# Patient Record
Sex: FEMALE | Race: BLACK OR AFRICAN AMERICAN | Employment: FULL TIME | URBAN - METROPOLITAN AREA
[De-identification: names, ages, dates, MRNs, and addresses within clinical notes are randomized per-mention and may not be internally consistent; named-entity substitution may affect disease eponyms.]

---

## 2017-03-09 DIAGNOSIS — I10 ESSENTIAL HYPERTENSION, BENIGN: ICD-10-CM

## 2017-03-09 RX ORDER — METOPROLOL SUCCINATE 25 MG/1
12.5 TABLET, EXTENDED RELEASE ORAL DAILY
Qty: 30 TAB | Refills: 6 | Status: SHIPPED | OUTPATIENT
Start: 2017-03-09 | End: 2018-03-08 | Stop reason: SDUPTHER

## 2017-04-26 ENCOUNTER — HOSPITAL ENCOUNTER (OUTPATIENT)
Dept: LAB | Age: 42
Discharge: HOME | End: 2017-04-26
Payer: COMMERCIAL

## 2017-04-26 ENCOUNTER — OFFICE VISIT (OUTPATIENT)
Dept: FAMILY MEDICINE CLINIC | Age: 42
End: 2017-04-26

## 2017-04-26 VITALS
WEIGHT: 156.4 LBS | SYSTOLIC BLOOD PRESSURE: 133 MMHG | BODY MASS INDEX: 28.78 KG/M2 | RESPIRATION RATE: 16 BRPM | HEIGHT: 62 IN | OXYGEN SATURATION: 98 % | TEMPERATURE: 98.2 F | HEART RATE: 82 BPM | DIASTOLIC BLOOD PRESSURE: 89 MMHG

## 2017-04-26 DIAGNOSIS — Z12.31 ENCOUNTER FOR SCREENING MAMMOGRAM FOR BREAST CANCER: ICD-10-CM

## 2017-04-26 DIAGNOSIS — Z51.81 ENCOUNTER FOR MEDICATION MONITORING: ICD-10-CM

## 2017-04-26 DIAGNOSIS — I10 ESSENTIAL HYPERTENSION, BENIGN: ICD-10-CM

## 2017-04-26 DIAGNOSIS — Z13.1 SCREENING FOR DIABETES MELLITUS: ICD-10-CM

## 2017-04-26 DIAGNOSIS — Z00.00 ROUTINE GENERAL MEDICAL EXAMINATION AT A HEALTH CARE FACILITY: Primary | ICD-10-CM

## 2017-04-26 DIAGNOSIS — N94.89 MENSTRUAL BLOATING: ICD-10-CM

## 2017-04-26 DIAGNOSIS — R14.0 MENSTRUAL BLOATING: ICD-10-CM

## 2017-04-26 DIAGNOSIS — Z11.4 SCREENING FOR HIV WITHOUT PRESENCE OF RISK FACTORS: ICD-10-CM

## 2017-04-26 DIAGNOSIS — Z12.4 SCREENING FOR MALIGNANT NEOPLASM OF CERVIX: ICD-10-CM

## 2017-04-26 LAB
BILIRUB UR QL STRIP: NEGATIVE
GLUCOSE UR-MCNC: NEGATIVE MG/DL
HBA1C MFR BLD HPLC: 5.8 %
KETONES P FAST UR STRIP-MCNC: NEGATIVE MG/DL
PH UR STRIP: 6 [PH] (ref 4.6–8)
PROT UR QL STRIP: NEGATIVE MG/DL
SP GR UR STRIP: 1 (ref 1–1.03)
UA UROBILINOGEN AMB POC: NORMAL (ref 0.2–1)
URINALYSIS CLARITY POC: CLEAR
URINALYSIS COLOR POC: YELLOW
URINE BLOOD POC: NEGATIVE
URINE LEUKOCYTES POC: NEGATIVE
URINE NITRITES POC: NEGATIVE

## 2017-04-26 PROCEDURE — 87624 HPV HI-RISK TYP POOLED RSLT: CPT | Performed by: FAMILY MEDICINE

## 2017-04-26 PROCEDURE — 88175 CYTOPATH C/V AUTO FLUID REDO: CPT | Performed by: FAMILY MEDICINE

## 2017-04-26 RX ORDER — NORETHINDRONE ACETATE, ETHINYL ESTRADIOL AND FERROUS FUMARATE 1MG-20(24)
KIT ORAL
Refills: 12 | COMMUNITY
Start: 2017-04-10 | End: 2017-07-05 | Stop reason: SDUPTHER

## 2017-04-26 RX ORDER — OSELTAMIVIR PHOSPHATE 75 MG
CAPSULE ORAL
COMMUNITY
Start: 2017-03-30 | End: 2017-04-26 | Stop reason: ALTCHOICE

## 2017-04-26 RX ORDER — HYDROCHLOROTHIAZIDE 25 MG/1
TABLET ORAL
Qty: 90 TAB | Refills: 3 | Status: SHIPPED | OUTPATIENT
Start: 2017-04-26 | End: 2018-07-13 | Stop reason: SDUPTHER

## 2017-04-26 RX ORDER — FLUCONAZOLE 150 MG/1
TABLET ORAL
Refills: 1 | COMMUNITY
Start: 2017-02-14 | End: 2017-04-26 | Stop reason: ALTCHOICE

## 2017-04-26 NOTE — PROGRESS NOTES
Chief Complaint   Patient presents with    Complete Physical     with pap   LMP 4/19/2017         CMP 7/29/2015    Lipid 7/29/2015    A1C 7/29/2015    Verbal order received per Dr. Koehler- obtain UA without micro- VORB

## 2017-04-26 NOTE — MR AVS SNAPSHOT
Visit Information Date & Time Provider Department Dept. Phone Encounter #  
 4/26/2017  8:15 AM Amy Clark MD [facility] [phone] [id number] Follow-up Instructions Return in about 6 months (around 10/26/2017). Upcoming Health Maintenance Date Due  
 PAP AKA CERVICAL CYTOLOGY 4/26/2020 DTaP/Tdap/Td series (2 - Td) 8/8/2024 Allergies as of 4/26/2017  Review Complete On: 4/26/2017 By: Amy Clark MD  
  
 Severity Noted Reaction Type Reactions Sulfa (Sulfonamide Antibiotics) Medium 07/16/2014    Hives Current Immunizations  Reviewed on 4/26/2017 Name Date Influenza Vaccine 10/5/2015, 10/20/2014 Tdap 8/8/2014 Reviewed by Amy Clark MD on 4/26/2017 at  8:55 AM  
You Were Diagnosed With   
  
 Codes Comments Routine general medical examination at a health care facility    -  Primary ICD-10-CM: Z00.00 ICD-9-CM: V70.0 Essential hypertension, benign     ICD-10-CM: I10 
ICD-9-CM: 401.1 Encounter for medication monitoring     ICD-10-CM: Z51.81 
ICD-9-CM: V58.83 Screening for diabetes mellitus     ICD-10-CM: Z13.1 ICD-9-CM: V77.1 Screening for malignant neoplasm of cervix     ICD-10-CM: Z12.4 ICD-9-CM: V76.2 Menstrual bloating     ICD-10-CM: N94.9 ICD-9-CM: 625.8 Encounter for screening mammogram for breast cancer     ICD-10-CM: Z12.31 
ICD-9-CM: V76.12 Vitals BP Pulse Temp Resp Height(growth percentile) Weight(growth percentile) 133/89 (BP 1 Location: Left arm, BP Patient Position: Sitting) 82 98.2 °F (36.8 °C) (Oral) 16 5' 2\" (1.575 m) 156 lb 6.4 oz (70.9 kg) LMP SpO2 BMI OB Status Smoking Status 04/19/2017 98% 28.61 kg/m2 Having regular periods Never Smoker Vitals History BMI and BSA Data Body Mass Index Body Surface Area  
 28.61 kg/m 2 1.76 m 2 Preferred Pharmacy Pharmacy [facility] Phone [facility] [address] [phone] Your Updated Medication List  
  
   
This list is accurate as of: 4/26/17  9:16 AM.  Always use your most recent med list.  
  
  
  
  
 hydroCHLOROthiazide 25 mg tablet Commonly known as:  HYDRODIURIL One pills daily as needed for menstrual bloating. metoprolol succinate 25 mg XL tablet Commonly known as:  TOPROL-XL Take 0.5 Tabs by mouth daily. MIBELAS 24 FE 1 mg-20 mcg(24) /75 mg (4) Chew Generic drug:  norethindrone-e.estradiol-iron TAKE 1 TABLET BY MOUTH EVERY DAY  
  
 XALATAN 0.005 % ophthalmic solution Generic drug:  latanoprost  
Administer 1 Drop to both eyes nightly. Prescriptions Sent to Pharmacy Refills  
 hydroCHLOROthiazide (HYDRODIURIL) 25 mg tablet 3 Sig: One pills daily as needed for menstrual bloating. Class: Normal  
 Pharmacy: [facility], [address] [place] [address] Ph #: [phone] We Performed the Following AMB POC HEMOGLOBIN A1C [73665 CPT(R)] AMB POC URINALYSIS DIP STICK AUTO W/ MICRO [70800 CPT(R)] CBC W/O DIFF [41388 CPT(R)] LIPID PANEL [32920 CPT(R)] METABOLIC PANEL, COMPREHENSIVE [95259 CPT(R)]   
 PAP (IMAGE GUIDED) W/REFL HPV ALL PATHOLOGY (826631) [WMV7002 Custom] Follow-up Instructions Return in about 6 months (around 10/26/2017). To-Do List   
 04/26/2017 Imaging:  [facility] MAMMO BI SCREENING INCL CAD Introducing [facility]! Dear Kisha Hernandez: Thank you for requesting a tripJane account. Our records indicate that you already have an active tripJane account. You can access your account anytime at [email or web address] Did you know that you can access your hospital and ER discharge instructions at any time in tripJane? You can also review all of your test results from your hospital stay or ER visit. Additional Information If you have questions, please visit the Frequently Asked Questions section of the [employer] website at [email or web address] Remember, [employer] is NOT to be used for urgent needs. For medical emergencies, dial 911. Now available from your iPhone and Android! Please provide this summary of care documentation to your next provider. Your primary care clinician is listed as Ursula Parker. If you have any questions after today's visit, please call [phone].

## 2017-04-26 NOTE — PROGRESS NOTES
Subjective:   37 y.o. female for Well Woman Check. Patient's last menstrual period was 04/19/2017. Social History: single partner, contraception - OCP (Oral Contraceptive Pills). Periods are regular. Bleeding about 5 days. No cramps or clots. Had myomectomy per gyn in December 2016. HTN follow up:  Compliant w/ meds, low salt diet, and exercise. No home bp monitoring. No swelling, headache or dizziness. No chest pain, SOB, palpitations. Otherwise feeling well since the last visit. Patient Active Problem List   Diagnosis Code    Glaucoma H40.9    Essential hypertension, benign I10    Encounter for medication monitoring Z51.81       Current Outpatient Prescriptions   Medication Sig Dispense Refill    MIBELAS 24 FE 1 mg-20 mcg(24) /75 mg (4) chew TAKE 1 TABLET BY MOUTH EVERY DAY  12    metoprolol succinate (TOPROL-XL) 25 mg XL tablet Take 0.5 Tabs by mouth daily. 30 Tab 6    hydrochlorothiazide (HYDRODIURIL) 25 mg tablet One pills daily as needed for menstrual bloating. 90 Tab 3    latanoprost (XALATAN) 0.005 % ophthalmic solution Administer 1 Drop to both eyes nightly. Allergies   Allergen Reactions    Sulfa (Sulfonamide Antibiotics) Hives       History reviewed. No pertinent past medical history. Past Surgical History:   Procedure Laterality Date    HX MYOMECTOMY  12/21/2016    Dr. Ericka Arceo       Family History   Problem Relation Age of Onset    Hypertension Mother     Lupus Mother     Heart Disease Mother     Cancer Father      lung cancer    Diabetes Sister     Hypertension Sister     Hypertension Brother     Kidney Disease Brother     Hypertension Sister        Social History   Substance Use Topics    Smoking status: Never Smoker    Smokeless tobacco: Not on file    Alcohol use 0.0 oz/week     0 Standard drinks or equivalent per week      Comment: occasionally          ROS:  Feeling well. No dyspnea or chest pain on exertion.   No abdominal pain, change in bowel habits, black or bloody stools. No urinary tract symptoms. GYN ROS: normal menses, no abnormal bleeding, pelvic pain or discharge, no breast pain or new or enlarging lumps on self exam. No neurological complaints. Objective:     Visit Vitals    /89 (BP 1 Location: Left arm, BP Patient Position: Sitting)    Pulse 82    Temp 98.2 °F (36.8 °C) (Oral)    Resp 16    Ht 5' 2\" (1.575 m)    Wt 156 lb 6.4 oz (70.9 kg)    LMP 04/19/2017    SpO2 98%    BMI 28.61 kg/m2     The patient appears well, alert, oriented x 3, in no distress. ENT normal.  Neck supple. No adenopathy or thyromegaly. KATERINA. Lungs are clear, good air entry, no wheezes, rhonchi or rales. S1 and S2 normal, no murmurs, regular rate and rhythm. Abdomen soft without tenderness, guarding, mass or organomegaly. Extremities show no edema, normal peripheral pulses. Neurological is normal, no focal findings. BREAST EXAM: breasts appear normal, no suspicious masses, no skin or nipple changes or axillary nodes    PELVIC EXAM: normal external genitalia, vulva, vagina, cervix, slightly enlarge uterus is noted.    Normal adnexa, PAP: Pap smear done today    Assessment/Plan:   well woman  no contraindication to continue use of oral contraceptives  pap smear  counseled on breast self exam, mammography screening and adequate intake of calcium and vitamin D  additional lab tests per orders  Laura Reeves was seen today for complete physical.    Diagnoses and all orders for this visit:    Routine general medical examination at a health care facility  -     AMB POC URINALYSIS DIP STICK AUTO W/ MICRO    Essential hypertension, benign  -     LIPID PANEL    Encounter for medication monitoring  -     METABOLIC PANEL, COMPREHENSIVE  -     CBC W/O DIFF    Screening for diabetes mellitus  -     AMB POC HEMOGLOBIN A1C    Screening for malignant neoplasm of cervix  -     PAP (IMAGE GUIDED) W/REFL HPV ALL PATHOLOGY (201235)    Menstrual bloating  -    Refill hydroCHLOROthiazide (HYDRODIURIL) 25 mg tablet; One pills daily as needed for menstrual bloating. Encounter for screening mammogram for breast cancer  -     [facility] MAMMO BI SCREENING INCL CAD; Future      Follow-up Disposition: 6 months. Reviewed diet, exercise and weight control  cardiovascular risk and specific lipid/LDL goals reviewed  reviewed medications and side effects in detail. I have discussed diagnosis listed in this note with pt and/or family. I have discussed treatment plans and options and the risk/benefit analysis of those options, including safe use of medications and possible medication side effects. Through the use of shared decision making we have agreed to the above plan. The patient has received an after-visit summary and questions were answered concerning future plans and follow up. Advise pt of any urgent changes then to proceed to the ER.

## 2017-04-27 LAB
ALBUMIN SERPL-MCNC: 4.4 G/DL (ref 3.5–5.5)
ALBUMIN/GLOB SERPL: 1.4 {RATIO} (ref 1.2–2.2)
ALP SERPL-CCNC: 86 IU/L (ref 39–117)
ALT SERPL-CCNC: 18 IU/L (ref 0–32)
AST SERPL-CCNC: 20 IU/L (ref 0–40)
BILIRUB SERPL-MCNC: 0.4 MG/DL (ref 0–1.2)
BUN SERPL-MCNC: 13 MG/DL (ref 6–24)
BUN/CREAT SERPL: 14 (ref 9–23)
CALCIUM SERPL-MCNC: 9.4 MG/DL (ref 8.7–10.2)
CHLORIDE SERPL-SCNC: 95 MMOL/L (ref 96–106)
CHOLEST SERPL-MCNC: 167 MG/DL (ref 100–199)
CO2 SERPL-SCNC: 26 MMOL/L (ref 18–29)
CREAT SERPL-MCNC: 0.95 MG/DL (ref 0.57–1)
ERYTHROCYTE [DISTWIDTH] IN BLOOD BY AUTOMATED COUNT: 14.3 % (ref 12.3–15.4)
GLOBULIN SER CALC-MCNC: 3.2 G/DL (ref 1.5–4.5)
GLUCOSE SERPL-MCNC: 101 MG/DL (ref 65–99)
HCT VFR BLD AUTO: 41.1 % (ref 34–46.6)
HDLC SERPL-MCNC: 65 MG/DL
HGB BLD-MCNC: 14.1 G/DL (ref 11.1–15.9)
HIV 1+2 AB+HIV1 P24 AG SERPL QL IA: NON REACTIVE
INTERPRETATION, 910389: NORMAL
LDLC SERPL CALC-MCNC: 90 MG/DL (ref 0–99)
MCH RBC QN AUTO: 30.7 PG (ref 26.6–33)
MCHC RBC AUTO-ENTMCNC: 34.3 G/DL (ref 31.5–35.7)
MCV RBC AUTO: 89 FL (ref 79–97)
PLATELET # BLD AUTO: 210 X10E3/UL (ref 150–379)
POTASSIUM SERPL-SCNC: 3.1 MMOL/L (ref 3.5–5.2)
PROT SERPL-MCNC: 7.6 G/DL (ref 6–8.5)
RBC # BLD AUTO: 4.6 X10E6/UL (ref 3.77–5.28)
SODIUM SERPL-SCNC: 139 MMOL/L (ref 134–144)
TRIGL SERPL-MCNC: 62 MG/DL (ref 0–149)
VLDLC SERPL CALC-MCNC: 12 MG/DL (ref 5–40)
WBC # BLD AUTO: 4.8 X10E3/UL (ref 3.4–10.8)

## 2017-05-03 RX ORDER — POTASSIUM CHLORIDE 750 MG/1
TABLET, EXTENDED RELEASE ORAL
Qty: 30 TAB | Refills: 3 | Status: SHIPPED | OUTPATIENT
Start: 2017-05-03 | End: 2017-10-18

## 2017-05-04 ENCOUNTER — TELEPHONE (OUTPATIENT)
Dept: FAMILY MEDICINE CLINIC | Age: 42
End: 2017-05-04

## 2017-05-05 ENCOUNTER — TELEPHONE (OUTPATIENT)
Dept: FAMILY MEDICINE CLINIC | Age: 42
End: 2017-05-05

## 2017-05-05 DIAGNOSIS — R87.810 ASCUS WITH POSITIVE HIGH RISK HPV CERVICAL: Primary | ICD-10-CM

## 2017-05-05 DIAGNOSIS — R87.610 ASCUS WITH POSITIVE HIGH RISK HPV CERVICAL: Primary | ICD-10-CM

## 2017-05-05 NOTE — TELEPHONE ENCOUNTER
Pt reports she was returning a call from Dr. Koehler regarding pap results. Informed pt Dr. Koehler was seeing pts and will forward message to Dr. Koehler. Pt verbalized understanding. Pt can be reached at [phone].

## 2017-05-26 ENCOUNTER — DOCUMENTATION ONLY (OUTPATIENT)
Dept: FAMILY MEDICINE CLINIC | Age: 42
End: 2017-05-26

## 2017-05-26 NOTE — PROGRESS NOTES
Appointment with Dr Octavia Haddad on Monday, June 5, 2017 at 9:45 am for abnormal pap. Above appointment was cancelled per Dr Katie Chauhan. He will see her in July. Message left for the patient.

## 2017-06-16 DIAGNOSIS — Z12.31 ENCOUNTER FOR SCREENING MAMMOGRAM FOR BREAST CANCER: ICD-10-CM

## 2017-07-05 RX ORDER — NORETHINDRONE ACETATE, ETHINYL ESTRADIOL AND FERROUS FUMARATE 1MG-20(24)
1 KIT ORAL DAILY
Qty: 1 DOSE PACK | Refills: 0 | Status: SHIPPED | OUTPATIENT
Start: 2017-07-05 | End: 2017-07-05 | Stop reason: SDUPTHER

## 2017-07-05 RX ORDER — NORETHINDRONE ACETATE, ETHINYL ESTRADIOL AND FERROUS FUMARATE 1MG-20(24)
1 KIT ORAL DAILY
Qty: 1 DOSE PACK | Refills: 0 | Status: SHIPPED | OUTPATIENT
Start: 2017-07-05 | End: 2017-08-07 | Stop reason: SDUPTHER

## 2017-08-07 RX ORDER — NORETHINDRONE ACETATE, ETHINYL ESTRADIOL AND FERROUS FUMARATE 1MG-20(24)
KIT ORAL
Refills: 0 | OUTPATIENT
Start: 2017-08-07

## 2017-08-07 RX ORDER — NORETHINDRONE ACETATE, ETHINYL ESTRADIOL AND FERROUS FUMARATE 1MG-20(24)
1 KIT ORAL DAILY
Qty: 1 DOSE PACK | Refills: 11 | Status: SHIPPED | OUTPATIENT
Start: 2017-08-07 | End: 2017-12-06 | Stop reason: SDUPTHER

## 2017-10-17 NOTE — PROGRESS NOTES
HISTORY OF PRESENT ILLNESS  Alba Cho is a 40 y.o. female. HPI   Follow up on BP. Tolerating toprol. HTN follow up:  Compliant w/ meds, low salt diet, and exercise. No home bp monitoring. No swelling, headache or dizziness. No chest pain, SOB, palpitations. Otherwise feeling well since the last visit. C/p trouble selling over the past 1 month. Wakes in the middle of the night and not able to fall back to sleep. Only caffeine is in th morning with coffee. Patient Active Problem List   Diagnosis Code    Glaucoma H40.9    Essential hypertension, benign I10    Encounter for medication monitoring Z51.81       Current Outpatient Prescriptions   Medication Sig Dispense Refill    MIBELAS 24 FE 1 mg-20 mcg(24) /75 mg (4) chew Take 1 Tab by mouth daily. 1 Dose Pack 11    potassium chloride (KLOR-CON) 10 mEq tablet Take 2 pills daily for 1 week for low potassium. Then take 2 pills daily on the days that you take your fluid pill. 30 Tab 3    hydroCHLOROthiazide (HYDRODIURIL) 25 mg tablet One pills daily as needed for menstrual bloating. 90 Tab 3    metoprolol succinate (TOPROL-XL) 25 mg XL tablet Take 0.5 Tabs by mouth daily. 30 Tab 6    latanoprost (XALATAN) 0.005 % ophthalmic solution Administer 1 Drop to both eyes nightly. Allergies   Allergen Reactions    Sulfa (Sulfonamide Antibiotics) Hives       No past medical history on file.       Past Surgical History:   Procedure Laterality Date    HX MYOMECTOMY  12/21/2016    Dr. Aleksandar Clements         Family History   Problem Relation Age of Onset    Hypertension Mother     Lupus Mother     Heart Disease Mother     Cancer Father      lung cancer    Diabetes Sister     Hypertension Sister     Hypertension Brother     Kidney Disease Brother     Hypertension Sister        Social History   Substance Use Topics    Smoking status: Never Smoker    Smokeless tobacco: Not on file    Alcohol use 0.0 oz/week     0 Standard drinks or equivalent per week      Comment: occasionally        Lab Results   Component Value Date/Time    WBC 4.8 04/26/2017 09:23 AM    HGB 14.1 04/26/2017 09:23 AM    HCT 41.1 04/26/2017 09:23 AM    PLATELET 962 33/50/5938 09:23 AM    MCV 89 04/26/2017 09:23 AM     Lab Results   Component Value Date/Time    Cholesterol, total 167 04/26/2017 09:23 AM    HDL Cholesterol 65 04/26/2017 09:23 AM    LDL, calculated 90 04/26/2017 09:23 AM    Triglyceride 62 04/26/2017 09:23 AM     Lab Results   Component Value Date/Time    ALT (SGPT) 18 04/26/2017 09:23 AM    AST (SGOT) 20 04/26/2017 09:23 AM    Alk. phosphatase 86 04/26/2017 09:23 AM    Bilirubin, total 0.4 04/26/2017 09:23 AM     Lab Results   Component Value Date/Time    GFR est AA 87 04/26/2017 09:23 AM    GFR est non-AA 75 04/26/2017 09:23 AM    Creatinine 0.95 04/26/2017 09:23 AM    BUN 13 04/26/2017 09:23 AM    Sodium 139 04/26/2017 09:23 AM    Potassium 3.1 04/26/2017 09:23 AM    Chloride 95 04/26/2017 09:23 AM    CO2 26 04/26/2017 09:23 AM      Lab Results   Component Value Date/Time    Hemoglobin A1c (POC) 5.8 04/26/2017 09:23 AM     Review of Systems   Constitutional: Negative for malaise/fatigue. HENT: Negative for congestion. Eyes: Negative for blurred vision. Respiratory: Negative for cough and shortness of breath. Cardiovascular: Negative for chest pain, palpitations and leg swelling. Gastrointestinal: Negative for heartburn, abdominal pain and constipation. Genitourinary: Negative for dysuria, urgency and frequency. Musculoskeletal: Negative for back pain and joint pain. Neurological: Negative for dizziness, tingling and headaches. Endo/Heme/Allergies: Negative for environmental allergies. Psychiatric/Behavioral: Negative for depression. The patient does not have insomnia. Physical Exam   Constitutional: She appears well-developed and well-nourished.    /80  Pulse 78  Temp 98.8 °F (37.1 °C) (Oral)   Resp 18  Ht 5' 2\" (1.575 m)  Wt 155 lb 6.4 oz (70.5 kg)  LMP 10/04/2017  SpO2 98%  BMI 28.42 kg/m2    HENT:   Right Ear: Tympanic membrane and ear canal normal.   Left Ear: Tympanic membrane and ear canal normal.   Nose: No mucosal edema or rhinorrhea. Mouth/Throat: Oropharynx is clear and moist and mucous membranes are normal.   Neck: Normal range of motion. Neck supple. No thyromegaly present. Cardiovascular: Normal rate and regular rhythm. No murmur heard. Pulmonary/Chest: Effort normal and breath sounds normal.   Abdominal: Soft. Bowel sounds are normal. There is no tenderness. Musculoskeletal: Normal range of motion. She exhibits no edema. Lymphadenopathy:     She has no cervical adenopathy. Skin: Skin is warm and dry. Psychiatric: She has a normal mood and affect. Nursing note and vitals reviewed. ASSESSMENT and PLAN  Diagnoses and all orders for this visit:    1. Essential hypertension, benign  Stable     2. Encounter for medication monitoring  -     METABOLIC PANEL, BASIC    3. Primary insomnia  -     TSH 3RD GENERATION  -     melatonin 5 mg cap capsule; Take 1 Cap by mouth nightly. Follow-up Disposition:  Return in about 6 months (around 4/18/2018). reviewed diet, exercise and weight control  cardiovascular risk and specific lipid/LDL goals reviewed  reviewed medications and side effects in detail    I have discussed diagnosis listed in this note with pt and/or family. I have discussed treatment plans and options and the risk/benefit analysis of those options, including safe use of medications and possible medication side effects. Through the use of shared decision making we have agreed to the above plan. The patient has received an after-visit summary and questions were answered concerning future plans and follow up. Advise pt of any urgent changes then to proceed to the ER.

## 2017-10-18 ENCOUNTER — OFFICE VISIT (OUTPATIENT)
Dept: FAMILY MEDICINE CLINIC | Age: 42
End: 2017-10-18

## 2017-10-18 VITALS
SYSTOLIC BLOOD PRESSURE: 126 MMHG | WEIGHT: 155.4 LBS | DIASTOLIC BLOOD PRESSURE: 80 MMHG | HEART RATE: 78 BPM | TEMPERATURE: 98.8 F | BODY MASS INDEX: 28.6 KG/M2 | OXYGEN SATURATION: 98 % | HEIGHT: 62 IN | RESPIRATION RATE: 18 BRPM

## 2017-10-18 DIAGNOSIS — Z51.81 ENCOUNTER FOR MEDICATION MONITORING: ICD-10-CM

## 2017-10-18 DIAGNOSIS — I10 ESSENTIAL HYPERTENSION, BENIGN: Primary | ICD-10-CM

## 2017-10-18 DIAGNOSIS — F51.01 PRIMARY INSOMNIA: ICD-10-CM

## 2017-10-18 RX ORDER — CLINDAMYCIN PHOSPHATE 100 MG/5G
CREAM VAGINAL
Refills: 1 | COMMUNITY
Start: 2017-07-14 | End: 2017-10-18 | Stop reason: ALTCHOICE

## 2017-10-18 RX ORDER — MELATONIN 5 MG
5 CAPSULE ORAL
Qty: 30 CAP | Refills: 0
Start: 2017-10-18 | End: 2018-04-18 | Stop reason: ALTCHOICE

## 2017-10-18 NOTE — MR AVS SNAPSHOT
Visit Information Date & Time Provider Department Dept. Phone Encounter #  
 10/18/2017  8:15 AM David Barry MD [facility] [phone] [id number] Follow-up Instructions Return in about 6 months (around 4/18/2018). Upcoming Health Maintenance Date Due INFLUENZA AGE 9 TO ADULT 11/6/2017* PAP AKA CERVICAL CYTOLOGY 4/26/2020 DTaP/Tdap/Td series (2 - Td) 8/8/2024 *Topic was postponed. The date shown is not the original due date. Allergies as of 10/18/2017  Review Complete On: 10/18/2017 By: David Barry MD  
  
 Severity Noted Reaction Type Reactions Sulfa (Sulfonamide Antibiotics) Medium 07/16/2014    Hives Current Immunizations  Reviewed on 10/18/2017 Name Date Influenza Vaccine 10/5/2015, 10/20/2014 Tdap 8/8/2014 Reviewed by David Barry MD on 10/18/2017 at  8:34 AM  
 Reviewed by David Barry MD on 10/18/2017 at  9:03 AM  
You Were Diagnosed With   
  
 Codes Comments Essential hypertension, benign    -  Primary ICD-10-CM: I10 
ICD-9-CM: 401.1 Encounter for medication monitoring     ICD-10-CM: Z51.81 
ICD-9-CM: V58.83 Primary insomnia     ICD-10-CM: F51.01 
ICD-9-CM: 307.42 Vitals BP Pulse Temp Resp Height(growth percentile) Weight(growth percentile) 126/80 78 98.8 °F (37.1 °C) (Oral) 18 5' 2\" (1.575 m) 155 lb 6.4 oz (70.5 kg) LMP SpO2 BMI OB Status Smoking Status 10/04/2017 98% 28.42 kg/m2 Having regular periods Never Smoker Vitals History BMI and BSA Data Body Mass Index Body Surface Area  
 28.42 kg/m 2 1.76 m 2 Preferred Pharmacy Pharmacy Name Phone [facility] [address], [address] [place] [address] [phone] Your Updated Medication List  
  
   
This list is accurate as of: 10/18/17  9:17 AM.  Always use your most recent med list.  
  
  
  
  
 hydroCHLOROthiazide 25 mg tablet Commonly known as:  HYDRODIURIL One pills daily as needed for menstrual bloating.  
  
 melatonin 5 mg Cap capsule Take 1 Cap by mouth nightly. metoprolol succinate 25 mg XL tablet Commonly known as:  TOPROL-XL Take 0.5 Tabs by mouth daily. MIBELAS 24 FE 1 mg-20 mcg(24) /75 mg (4) Chew Generic drug:  norethindrone-e.estradiol-iron Take 1 Tab by mouth daily. XALATAN 0.005 % ophthalmic solution Generic drug:  latanoprost  
Administer 1 Drop to both eyes nightly. We Performed the Following METABOLIC PANEL, BASIC [03728 CPT(R)] TSH 3RD GENERATION [47444 CPT(R)] Follow-up Instructions Return in about 6 months (around 4/18/2018). Introducing hospitals & HEALTH [facility]! Dear Dee Davies: Thank you for requesting a REQQI account. Our records indicate that you already have an active REQQI account. You can access your account anytime at [email or web address] Did you know that you can access your hospital and ER discharge instructions at any time in REQQI? You can also review all of your test results from your hospital stay or ER visit. Additional Information If you have questions, please visit the Frequently Asked Questions section of the REQQI website at [email or web address] Remember, REQQI is NOT to be used for urgent needs. For medical emergencies, dial 911. Now available from your iPhone and Android! Please provide this summary of care documentation to your next provider. Your primary care clinician is listed as Carole Bonds. If you have any questions after today's visit, please call [phone].

## 2017-10-18 NOTE — PROGRESS NOTES
Chief Complaint   Patient presents with    Hypertension     6 month follow up         CMP 4/26/2017    Lipid 4/26/2017    A1C 4/26/2017

## 2017-10-19 LAB
BUN SERPL-MCNC: 12 MG/DL (ref 6–24)
BUN/CREAT SERPL: 14 (ref 9–23)
CALCIUM SERPL-MCNC: 9.2 MG/DL (ref 8.7–10.2)
CHLORIDE SERPL-SCNC: 96 MMOL/L (ref 96–106)
CO2 SERPL-SCNC: 26 MMOL/L (ref 18–29)
CREAT SERPL-MCNC: 0.88 MG/DL (ref 0.57–1)
GLUCOSE SERPL-MCNC: 88 MG/DL (ref 65–99)
POTASSIUM SERPL-SCNC: 3.8 MMOL/L (ref 3.5–5.2)
SODIUM SERPL-SCNC: 140 MMOL/L (ref 134–144)
TSH SERPL DL<=0.005 MIU/L-ACNC: 1.87 UIU/ML (ref 0.45–4.5)

## 2017-12-06 RX ORDER — NORETHINDRONE ACETATE, ETHINYL ESTRADIOL AND FERROUS FUMARATE 1MG-20(24)
1 KIT ORAL DAILY
Qty: 1 DOSE PACK | Refills: 11 | Status: SHIPPED | OUTPATIENT
Start: 2017-12-06 | End: 2018-09-24 | Stop reason: SDUPTHER

## 2017-12-06 NOTE — TELEPHONE ENCOUNTER
----- Message from Darek Nava sent at 12/6/2017 10:11 AM EST -----  Regarding: Dr. Laureano Heading refill  Angeles Solis with [facility] [phone] called to f/up on \"Melodetta 90 day supply\". Wants to know if rx has been confirmed or denied.

## 2018-03-08 DIAGNOSIS — I10 ESSENTIAL HYPERTENSION, BENIGN: ICD-10-CM

## 2018-03-08 RX ORDER — METOPROLOL SUCCINATE 25 MG/1
12.5 TABLET, EXTENDED RELEASE ORAL DAILY
Qty: 90 TAB | Refills: 3 | Status: SHIPPED | OUTPATIENT
Start: 2018-03-08 | End: 2019-03-09 | Stop reason: SDUPTHER

## 2018-04-18 ENCOUNTER — HOSPITAL ENCOUNTER (OUTPATIENT)
Dept: LAB | Age: 43
Discharge: HOME | End: 2018-04-18
Payer: COMMERCIAL

## 2018-04-18 ENCOUNTER — OFFICE VISIT (OUTPATIENT)
Dept: FAMILY MEDICINE CLINIC | Age: 43
End: 2018-04-18

## 2018-04-18 VITALS
HEIGHT: 62 IN | TEMPERATURE: 98 F | DIASTOLIC BLOOD PRESSURE: 85 MMHG | OXYGEN SATURATION: 100 % | RESPIRATION RATE: 16 BRPM | WEIGHT: 158.4 LBS | BODY MASS INDEX: 29.15 KG/M2 | SYSTOLIC BLOOD PRESSURE: 135 MMHG | HEART RATE: 76 BPM

## 2018-04-18 DIAGNOSIS — Z00.00 ROUTINE GENERAL MEDICAL EXAMINATION AT A HEALTH CARE FACILITY: Primary | ICD-10-CM

## 2018-04-18 DIAGNOSIS — Z51.81 ENCOUNTER FOR MEDICATION MONITORING: ICD-10-CM

## 2018-04-18 DIAGNOSIS — Z12.4 SCREENING FOR MALIGNANT NEOPLASM OF CERVIX: ICD-10-CM

## 2018-04-18 DIAGNOSIS — I10 ESSENTIAL HYPERTENSION, BENIGN: ICD-10-CM

## 2018-04-18 DIAGNOSIS — Z13.1 SCREENING FOR DIABETES MELLITUS: ICD-10-CM

## 2018-04-18 LAB — HBA1C MFR BLD HPLC: 5.7 %

## 2018-04-18 PROCEDURE — 87624 HPV HI-RISK TYP POOLED RSLT: CPT | Performed by: FAMILY MEDICINE

## 2018-04-18 PROCEDURE — 88175 CYTOPATH C/V AUTO FLUID REDO: CPT | Performed by: FAMILY MEDICINE

## 2018-04-18 RX ORDER — CLINDAMYCIN PHOSPHATE 100 MG/5G
CREAM VAGINAL
Refills: 1 | COMMUNITY
Start: 2018-03-20 | End: 2018-04-18 | Stop reason: ALTCHOICE

## 2018-04-18 RX ORDER — MELATONIN 5 MG
10 CAPSULE ORAL
COMMUNITY
End: 2018-10-24 | Stop reason: ALTCHOICE

## 2018-04-18 NOTE — PROGRESS NOTES
Chief Complaint   Patient presents with    Complete Physical     with pap- cycle started today     Mammogram 5/17/2017      1. Have you been to the ER, urgent care clinic since your last visit? Hospitalized since your last visit? No    2. Have you seen or consulted any other health care providers outside of the [address] [place] [address] since your last visit? Include any pap smears or colon screening.  No

## 2018-04-18 NOTE — PROGRESS NOTES
Subjective:   40 y.o. female for Well Woman Check. Patient's last menstrual period was 04/18/2018. HTN follow up:  Compliant w/ meds, low salt diet, and exercise. No home bp monitoring. No swelling, headache or dizziness. No chest pain, SOB, palpitations. Otherwise feeling well since the last visit. Patient Active Problem List   Diagnosis Code    Glaucoma H40.9    Essential hypertension, benign I10    Encounter for medication monitoring Z51.81     Current Outpatient Prescriptions   Medication Sig Dispense Refill    melatonin 5 mg cap capsule Take 10 mg by mouth nightly as needed.  metoprolol succinate (TOPROL-XL) 25 mg XL tablet Take 0.5 Tabs by mouth daily. 90 Tab 3    MIBELAS 24 FE 1 mg-20 mcg(24) /75 mg (4) chew Take 1 Tab by mouth daily. 1 Dose Pack 11    hydroCHLOROthiazide (HYDRODIURIL) 25 mg tablet One pills daily as needed for menstrual bloating. 90 Tab 3    latanoprost (XALATAN) 0.005 % ophthalmic solution Administer 1 Drop to both eyes nightly. Allergies   Allergen Reactions    Sulfa (Sulfonamide Antibiotics) Hives     History reviewed. No pertinent past medical history. Past Surgical History:   Procedure Laterality Date    HX MYOMECTOMY  12/21/2016    Dr. Yu Narvaez     Family History   Problem Relation Age of Onset    Hypertension Mother     Lupus Mother     Heart Disease Mother     Cancer Father      lung cancer    Diabetes Sister     Hypertension Sister     Hypertension Brother     Kidney Disease Brother     Hypertension Sister      Social History   Substance Use Topics    Smoking status: Never Smoker    Smokeless tobacco: Never Used    Alcohol use 0.0 oz/week     0 Standard drinks or equivalent per week      Comment: occasionally        ROS:  Feeling well. No dyspnea or chest pain on exertion. No abdominal pain, change in bowel habits, black or bloody stools. No urinary tract symptoms.  GYN ROS: normal menses, no abnormal bleeding, pelvic pain or discharge, no breast pain or new or enlarging lumps on self exam. No neurological complaints. Objective:     Visit Vitals    /85 (BP 1 Location: Left arm, BP Patient Position: Sitting)    Pulse 76    Temp 98 °F (36.7 °C) (Oral)    Resp 16    Ht 5' 2\" (1.575 m)    Wt 158 lb 6.4 oz (71.8 kg)    LMP 04/18/2018    SpO2 100%    BMI 28.97 kg/m2     The patient appears well, alert, oriented x 3, in no distress. ENT normal.  Neck supple. No adenopathy or thyromegaly. KATERINA. Lungs are clear, good air entry, no wheezes, rhonchi or rales. S1 and S2 normal, no murmurs, regular rate and rhythm. Abdomen soft without tenderness, guarding, mass or organomegaly. Extremities show no edema, normal peripheral pulses. Neurological is normal, no focal findings. BREAST EXAM: breasts appear normal, no suspicious masses, no skin or nipple changes or axillary nodes    PELVIC EXAM: normal external genitalia, vulva, vagina, cervix, uterus and adnexa  Rectal: not done due to menstrual blood at the anal opening. Assessment/Plan:   well woman  mammogram  pap smear  counseled on breast self exam, mammography screening and adequate intake of calcium and vitamin D  additional lab tests per orders  Diagnoses and all orders for this visit:    1. Routine general medical examination at a health care facility    2. Essential hypertension, benign  Discussed sodium restriction, high k rich diet, maintaining ideal body weight and regular exercise program such as daily walking 30 min perday 4-5 times per week, as physiologic means to achieve blood pressure control.  Medication compliance advised. -     LIPID PANEL    3. Encounter for medication monitoring  -     METABOLIC PANEL, COMPREHENSIVE  -     CBC W/O DIFF    4. Screening for diabetes mellitus  -     AMB POC HEMOGLOBIN A1C    5.  Screening for malignant neoplasm of cervix  -     PAP (IMAGE GUIDED) W/REFL HPV ALL PATHOLOGY ([id number])      Follow-up Disposition:  Return in about 6 months (around 10/18/2018). reviewed diet, exercise and weight control  cardiovascular risk and specific lipid/LDL goals reviewed  reviewed medications and side effects in detail. I have discussed diagnosis listed in this note with pt and/or family. I have discussed treatment plans and options and the risk/benefit analysis of those options, including safe use of medications and possible medication side effects. Through the use of shared decision making we have agreed to the above plan. The patient has received an after-visit summary and questions were answered concerning future plans and follow up. Advise pt of any urgent changes then to proceed to the ER.

## 2018-04-18 NOTE — PROGRESS NOTES
HISTORY OF PRESENT ILLNESS  Micah Sanchez is a 40 y.o. female. HPI   Follow up on BP. Tolerating toprol. HTN follow up:  Compliant w/ meds, low salt diet, and exercise. No home bp monitoring. No swelling, headache or dizziness. No chest pain, SOB, palpitations. Otherwise feeling well since the last visit. C/p trouble selling over the past 1 month. Wakes in the middle of the night and not able to fall back to sleep. Only caffeine is in th morning with coffee. {Choose one or more HPI Chronic Disease Notes, press DELETE if none desired:03693}  {Choose one or more SmartLinks; press DELETE if none desired:[id number]}   {Choose one or more Last Lab values; press DELETE if none desired:0975255}     Review of Systems   Constitutional: Negative for malaise/fatigue. HENT: Negative for congestion. Eyes: Negative for blurred vision. Respiratory: Negative for cough and shortness of breath. Cardiovascular: Negative for chest pain, palpitations and leg swelling. Gastrointestinal: Negative for abdominal pain, constipation and heartburn. Genitourinary: Negative for dysuria, frequency and urgency. Musculoskeletal: Negative for back pain and joint pain. Neurological: Negative for dizziness, tingling and headaches. Endo/Heme/Allergies: Negative for environmental allergies. Psychiatric/Behavioral: Negative for depression. The patient does not have insomnia. Physical Exam   Constitutional: She appears well-developed and well-nourished. HENT:   Right Ear: Tympanic membrane and ear canal normal.   Left Ear: Tympanic membrane and ear canal normal.   Nose: No mucosal edema or rhinorrhea. Mouth/Throat: Oropharynx is clear and moist and mucous membranes are normal.   Neck: Normal range of motion. Neck supple. No thyromegaly present. Cardiovascular: Normal rate and regular rhythm. No murmur heard. Pulmonary/Chest: Effort normal and breath sounds normal.   Abdominal: Soft.  Bowel sounds are normal. There is no tenderness. Musculoskeletal: Normal range of motion. She exhibits no edema. Lymphadenopathy:     She has no cervical adenopathy. Skin: Skin is warm and dry. Psychiatric: She has a normal mood and affect. Nursing note and vitals reviewed.       ASSESSMENT and PLAN  {ASSESSMENT/PLAN:21122}

## 2018-04-18 NOTE — MR AVS SNAPSHOT
[facility] 
 
 
 [address] [facility] [place] [address] [address] 
[phone] Patient: Radha Stein 
MRN: [id number] :[id number] Visit Information Date & Time Provider Department Dept. Phone Encounter #  
 2018  3:00 PM Lauren Dalal MD [facility] [phone] [id number] Follow-up Instructions Return in about 6 months (around 10/18/2018). Upcoming Health Maintenance Date Due Influenza Age 5 to Adult 2017 PAP AKA CERVICAL CYTOLOGY 2020 DTaP/Tdap/Td series (2 - Td) 2024 Allergies as of 2018  Review Complete On: 2018 By: Nicolas Burks LPN Severity Noted Reaction Type Reactions Sulfa (Sulfonamide Antibiotics) Medium 2014    Hives Current Immunizations  Reviewed on 2018 Name Date Influenza Vaccine 10/30/2017, 10/5/2015, 10/20/2014 Tdap 2014 Reviewed by Nicolas Burks LPN on  at  3:11 PM  
 Reviewed by Lauren Dalal MD on 2018 at  4:06 PM  
You Were Diagnosed With   
  
 Codes Comments Essential hypertension, benign    -  Primary ICD-10-CM: I10 
ICD-9-CM: 401.1 Encounter for medication monitoring     ICD-10-CM: Z51.81 
ICD-9-CM: V58.83 Screening for diabetes mellitus     ICD-10-CM: Z13.1 ICD-9-CM: V77.1 Screening for malignant neoplasm of cervix     ICD-10-CM: Z12.4 ICD-9-CM: V76.2 Vitals BP Pulse Temp Resp Height(growth percentile) Weight(growth percentile) 135/85 (BP 1 Location: Left arm, BP Patient Position: Sitting) 76 98 °F (36.7 °C) (Oral) 16 5' 2\" (1.575 m) 158 lb 6.4 oz (71.8 kg) LMP SpO2 BMI OB Status Smoking Status 2018 100% 28.97 kg/m2 Having regular periods Never Smoker Vitals History BMI and BSA Data Body Mass Index Body Surface Area  
 28.97 kg/m 2 1.77 m 2 Preferred Pharmacy Pharmacy Name Phone [facility] [address] [place] [address], [address] [place] [address] [phone] Your Updated Medication List  
  
   
This list is accurate as of 4/18/18  4:45 PM.  Always use your most recent med list.  
  
  
  
  
 hydroCHLOROthiazide 25 mg tablet Commonly known as:  HYDRODIURIL One pills daily as needed for menstrual bloating.  
  
 melatonin 5 mg Cap capsule Take 10 mg by mouth nightly as needed. metoprolol succinate 25 mg XL tablet Commonly known as:  TOPROL-XL Take 0.5 Tabs by mouth daily. MIBELAS 24 FE 1 mg-20 mcg(24) /75 mg (4) Chew Generic drug:  norethindrone-e.estradiol-iron Take 1 Tab by mouth daily. XALATAN 0.005 % ophthalmic solution Generic drug:  latanoprost  
Administer 1 Drop to both eyes nightly. We Performed the Following AMB POC HEMOGLOBIN A1C [60964 CPT(R)] CBC W/O DIFF [46159 CPT(R)] LIPID PANEL [74385 CPT(R)] METABOLIC PANEL, COMPREHENSIVE [34421 CPT(R)]   
 PAP (IMAGE GUIDED) W/REFL HPV ALL PATHOLOGY ([id number]) [[id number] Custom] Follow-up Instructions Return in about 6 months (around 10/18/2018). Introducing [facility]! Dear Russell De Souza: Thank you for requesting a [employer] account. Our records indicate that you already have an active Wizpert account. You can access your account anytime at [email or web address] Did you know that you can access your hospital and ER discharge instructions at any time in Wizpert? You can also review all of your test results from your hospital stay or ER visit. Additional Information If you have questions, please visit the Frequently Asked Questions section of the W[employer]pert website at [email or web address] Remember, W[employer]pert is NOT to be used for urgent needs. For medical emergencies, dial 911. Now available from your iPhone and Android! Please provide this summary of care documentation to your next provider. Your primary care clinician is listed as Kimmie Blakely. If you have any questions after today's visit, please call [phone].

## 2018-04-19 LAB
ALBUMIN SERPL-MCNC: 4.4 G/DL (ref 3.5–5.5)
ALBUMIN/GLOB SERPL: 1.5 {RATIO} (ref 1.2–2.2)
ALP SERPL-CCNC: 76 IU/L (ref 39–117)
ALT SERPL-CCNC: 15 IU/L (ref 0–32)
AST SERPL-CCNC: 20 IU/L (ref 0–40)
BILIRUB SERPL-MCNC: <0.2 MG/DL (ref 0–1.2)
BUN SERPL-MCNC: 12 MG/DL (ref 6–24)
BUN/CREAT SERPL: 12 (ref 9–23)
CALCIUM SERPL-MCNC: 9.7 MG/DL (ref 8.7–10.2)
CHLORIDE SERPL-SCNC: 97 MMOL/L (ref 96–106)
CHOLEST SERPL-MCNC: 166 MG/DL (ref 100–199)
CO2 SERPL-SCNC: 26 MMOL/L (ref 18–29)
CREAT SERPL-MCNC: 1 MG/DL (ref 0.57–1)
ERYTHROCYTE [DISTWIDTH] IN BLOOD BY AUTOMATED COUNT: 13.4 % (ref 12.3–15.4)
GFR SERPLBLD CREATININE-BSD FMLA CKD-EPI: 70 ML/MIN/1.73
GFR SERPLBLD CREATININE-BSD FMLA CKD-EPI: 81 ML/MIN/1.73
GLOBULIN SER CALC-MCNC: 2.9 G/DL (ref 1.5–4.5)
GLUCOSE SERPL-MCNC: 94 MG/DL (ref 65–99)
HCT VFR BLD AUTO: 41.1 % (ref 34–46.6)
HDLC SERPL-MCNC: 60 MG/DL
HGB BLD-MCNC: 13.9 G/DL (ref 11.1–15.9)
INTERPRETATION, 910389: NORMAL
LDLC SERPL CALC-MCNC: 91 MG/DL (ref 0–99)
MCH RBC QN AUTO: 31.4 PG (ref 26.6–33)
MCHC RBC AUTO-ENTMCNC: 33.8 G/DL (ref 31.5–35.7)
MCV RBC AUTO: 93 FL (ref 79–97)
PLATELET # BLD AUTO: 194 X10E3/UL (ref 150–379)
POTASSIUM SERPL-SCNC: 3.4 MMOL/L (ref 3.5–5.2)
PROT SERPL-MCNC: 7.3 G/DL (ref 6–8.5)
RBC # BLD AUTO: 4.43 X10E6/UL (ref 3.77–5.28)
SODIUM SERPL-SCNC: 141 MMOL/L (ref 134–144)
TRIGL SERPL-MCNC: 75 MG/DL (ref 0–149)
VLDLC SERPL CALC-MCNC: 15 MG/DL (ref 5–40)
WBC # BLD AUTO: 4.9 X10E3/UL (ref 3.4–10.8)

## 2018-04-19 RX ORDER — POTASSIUM CHLORIDE 600 MG/1
16 CAPSULE, EXTENDED RELEASE ORAL DAILY
Qty: 60 CAP | Refills: 6 | Status: SHIPPED | OUTPATIENT
Start: 2018-04-19 | End: 2018-10-24 | Stop reason: ALTCHOICE

## 2018-05-17 ENCOUNTER — TELEPHONE (OUTPATIENT)
Dept: FAMILY MEDICINE CLINIC | Age: 43
End: 2018-05-17

## 2018-05-17 NOTE — PROGRESS NOTES
Please fax pap report to Dr. Hu Charles. Please make his nurse aware of abnormal pap and that pt has appt with Dr. Uli Hernandez for next month.

## 2018-07-13 DIAGNOSIS — I10 ESSENTIAL HYPERTENSION, BENIGN: ICD-10-CM

## 2018-07-13 RX ORDER — HYDROCHLOROTHIAZIDE 25 MG/1
TABLET ORAL
Qty: 90 TAB | Refills: 0 | Status: SHIPPED | OUTPATIENT
Start: 2018-07-13 | End: 2018-10-17 | Stop reason: SDUPTHER

## 2018-09-24 RX ORDER — NORETHINDRONE ACETATE, ETHINYL ESTRADIOL AND FERROUS FUMARATE 1MG-20(24)
1 KIT ORAL DAILY
Qty: 3 DOSE PACK | Refills: 4 | Status: SHIPPED | OUTPATIENT
Start: 2018-09-24 | End: 2018-12-14 | Stop reason: SDUPTHER

## 2018-09-24 NOTE — TELEPHONE ENCOUNTER
Last Visit: 4/1871-Dldqup-Tendcg  Next Appt: 10/09-Heojgz-Brhrck  Previous Refill Encounter: 12/6/17-1+11    Requested Prescriptions     Pending Prescriptions Disp Refills    MIBELAS 24 FE 1 mg-20 mcg(24) /75 mg (4) chew 3 Dose Pack 4     Sig: Take 1 Tab by mouth daily.

## 2018-10-17 DIAGNOSIS — I10 ESSENTIAL HYPERTENSION, BENIGN: ICD-10-CM

## 2018-10-17 RX ORDER — HYDROCHLOROTHIAZIDE 25 MG/1
TABLET ORAL
Qty: 90 TAB | Refills: 3 | Status: SHIPPED | OUTPATIENT
Start: 2018-10-17 | End: 2019-11-11 | Stop reason: SDUPTHER

## 2018-10-17 NOTE — TELEPHONE ENCOUNTER
Last Visit: 4/18  Next Appt: 10/24  Previous Refill Encounter: 7/13-90+0    Requested Prescriptions     Pending Prescriptions Disp Refills    hydroCHLOROthiazide (HYDRODIURIL) 25 mg tablet 90 Tab 3     Sig: One pills daily as needed for menstrual bloating.

## 2018-10-24 ENCOUNTER — OFFICE VISIT (OUTPATIENT)
Dept: FAMILY MEDICINE CLINIC | Age: 43
End: 2018-10-24

## 2018-10-24 VITALS
HEIGHT: 62 IN | SYSTOLIC BLOOD PRESSURE: 130 MMHG | TEMPERATURE: 98.3 F | BODY MASS INDEX: 27.71 KG/M2 | RESPIRATION RATE: 16 BRPM | HEART RATE: 77 BPM | OXYGEN SATURATION: 98 % | WEIGHT: 150.6 LBS | DIASTOLIC BLOOD PRESSURE: 84 MMHG

## 2018-10-24 DIAGNOSIS — Z51.81 ENCOUNTER FOR MEDICATION MONITORING: ICD-10-CM

## 2018-10-24 DIAGNOSIS — I10 ESSENTIAL HYPERTENSION, BENIGN: Primary | ICD-10-CM

## 2018-10-24 NOTE — PROGRESS NOTES
HISTORY OF PRESENT ILLNESS Keesha Smith is a 44 y.o. female. HPI Follow up on BP. Tolerating toprol. HTN follow up: 
Compliant w/ meds, low salt diet, and exercise. No home bp monitoring. No swelling, headache or dizziness. No chest pain, SOB, palpitations. Otherwise feeling well since the last visit. Patient Active Problem List  
Diagnosis Code  Glaucoma H40.9  Essential hypertension, benign I10  
 Encounter for medication monitoring Z51.81 Current Outpatient Medications Medication Sig Dispense Refill  hydroCHLOROthiazide (HYDRODIURIL) 25 mg tablet One pills daily as needed for menstrual bloating. 90 Tab 3  
 MIBELAS 24 FE 1 mg-20 mcg(24) /75 mg (4) chew Take 1 Tab by mouth daily. 3 Dose Pack 4  potassium chloride SR (MICRO K 8) 8 mEq capsule Take 2 Caps by mouth daily. For low potassium 60 Cap 6  
 melatonin 5 mg cap capsule Take 10 mg by mouth nightly as needed.  metoprolol succinate (TOPROL-XL) 25 mg XL tablet Take 0.5 Tabs by mouth daily. 90 Tab 3  
 latanoprost (XALATAN) 0.005 % ophthalmic solution Administer 1 Drop to both eyes nightly. Allergies Allergen Reactions  Sulfa (Sulfonamide Antibiotics) Hives No past medical history on file. Past Surgical History:  
Procedure Laterality Date  HX MYOMECTOMY  12/21/2016 Dr. Gary Meza Family History Problem Relation Age of Onset  Hypertension Mother  Lupus Mother  Heart Disease Mother  Cancer Father   
     lung cancer  Diabetes Sister  Hypertension Sister  Hypertension Brother  Kidney Disease Brother  Hypertension Sister Social History Tobacco Use  Smoking status: Never Smoker  Smokeless tobacco: Never Used Substance Use Topics  Alcohol use: Yes Alcohol/week: 0.0 oz  
  Comment: occasionally Lab Results Component Value Date/Time  WBC 4.9 04/18/2018 04:56 PM  
 HGB 13.9 04/18/2018 04:56 PM  
 HCT 41.1 04/18/2018 04:56 PM  
 PLATELET 186 22/68/0916 04:56 PM  
 MCV 93 04/18/2018 04:56 PM  
 
Lab Results Component Value Date/Time Cholesterol, total 166 04/18/2018 04:56 PM  
 HDL Cholesterol 60 04/18/2018 04:56 PM  
 LDL, calculated 91 04/18/2018 04:56 PM  
 Triglyceride 75 04/18/2018 04:56 PM  
 
Lab Results Component Value Date/Time ALT (SGPT) 15 04/18/2018 04:56 PM  
 AST (SGOT) 20 04/18/2018 04:56 PM  
 Alk. phosphatase 76 04/18/2018 04:56 PM  
 Bilirubin, total <0.2 04/18/2018 04:56 PM  
 
Lab Results Component Value Date/Time GFR est AA 81 04/18/2018 04:56 PM  
 GFR est non-AA 70 04/18/2018 04:56 PM  
 Creatinine 1.00 04/18/2018 04:56 PM  
 BUN 12 04/18/2018 04:56 PM  
 Sodium 141 04/18/2018 04:56 PM  
 Potassium 3.4 (L) 04/18/2018 04:56 PM  
 Chloride 97 04/18/2018 04:56 PM  
 CO2 26 04/18/2018 04:56 PM  
  
Lab Results Component Value Date/Time Hemoglobin A1c (POC) 5.7 04/18/2018 04:50 PM  
 
Review of Systems Constitutional: Negative for malaise/fatigue. HENT: Negative for congestion. Eyes: Negative for blurred vision. Respiratory: Negative for cough and shortness of breath. Cardiovascular: Negative for chest pain, palpitations and leg swelling. Gastrointestinal: Negative for heartburn, abdominal pain and constipation. Genitourinary: Negative for dysuria, urgency and frequency. Musculoskeletal: Negative for back pain and joint pain. Neurological: Negative for dizziness, tingling and headaches. Endo/Heme/Allergies: Negative for environmental allergies. Psychiatric/Behavioral: Negative for depression. The patient does not have insomnia. Physical Exam  
Constitutional: She appears well-developed and well-nourished. /84   Pulse 77   Temp 98.3 °F (36.8 °C) (Oral)   Resp 16   Ht 5' 2\" (1.575 m)   Wt 150 lb 9.6 oz (68.3 kg)   LMP 10/01/2018   SpO2 98%   BMI 27.55 kg/m² HENT:  
Right Ear: Tympanic membrane and ear canal normal.  
 Left Ear: Tympanic membrane and ear canal normal.  
Nose: No mucosal edema or rhinorrhea. Mouth/Throat: Oropharynx is clear and moist and mucous membranes are normal.  
Neck: Normal range of motion. Neck supple. No thyromegaly present. Cardiovascular: Normal rate and regular rhythm. No murmur heard. Pulmonary/Chest: Effort normal and breath sounds normal.  
Abdominal: Soft. Bowel sounds are normal. There is no tenderness. Musculoskeletal: Normal range of motion. She exhibits no edema. Lymphadenopathy:  
  She has no cervical adenopathy. Skin: Skin is warm and dry. Psychiatric: She has a normal mood and affect. Nursing note and vitals reviewed. ASSESSMENT and PLAN Diagnoses and all orders for this visit: 1. Essential hypertension, benign She stopped taking the k. Trying to get in the extra K in her diet. Discussed sodium restriction, high k rich diet, maintaining ideal body weight and regular exercise program such as daily walking 30 min perday 4-5 times per week, as physiologic means to achieve blood pressure control.  Medication compliance advised. 2. Encounter for medication monitoring -     METABOLIC PANEL, BASIC Follow-up Disposition: 
Return in about 6 months (around 4/24/2019). reviewed diet, exercise and weight control 
cardiovascular risk and specific lipid/LDL goals reviewed 
reviewed medications and side effects in detail I have discussed diagnosis listed in this note with pt and/or family. I have discussed treatment plans and options and the risk/benefit analysis of those options, including safe use of medications and possible medication side effects. Through the use of shared decision making we have agreed to the above plan. The patient has received an after-visit summary and questions were answered concerning future plans and follow up. Advise pt of any urgent changes then to proceed to the ER.

## 2018-10-24 NOTE — LETTER
10/25/2018 11:05 AM 
 
Ms. Lucille Rawls 
[address] [address] Dear Lucille Rawls: 
 
Please find your most recent results below. Resulted Orders METABOLIC PANEL, BASIC Result Value Ref Range Glucose 76 65 - 99 mg/dL BUN 15 6 - 24 mg/dL Creatinine 1.00 0.57 - 1.00 mg/dL GFR est non-AA 70 >59 mL/min/1.73 GFR est AA 80 >59 mL/min/1.73  
 BUN/Creatinine ratio 15 9 - 23 Sodium 141 134 - 144 mmol/L Potassium 3.3 (L) 3.5 - 5.2 mmol/L Chloride 99 96 - 106 mmol/L  
 CO2 27 20 - 29 mmol/L Calcium 9.8 8.7 - 10.2 mg/dL Narrative Performed at:  [address]  [id number] : Avril Varela MD, Phone:  [phone] RECOMMENDATIONS: 
Your potassium level is low. Please restart your potassium to take 1 tab daily for the next 30 days. After this, then take the potassium one tab daily only on the days that you are taking your fluid pill for menstrual bloating. Prescription is enclosed. Please call me if you have any questions: [phone] Sincerely, Zohreh Thomas MD

## 2018-10-24 NOTE — PROGRESS NOTES
Chief Complaint Patient presents with  Hypertension  
  follow up Mammogram 7/11/2018 1. Have you been to the ER, urgent care clinic since your last visit? Hospitalized since your last visit? No 
 
2. Have you seen or consulted any other health care providers outside of the [address] [place] [address] since your last visit? Include any pap smears or colon screening.  No

## 2018-10-25 LAB
BUN SERPL-MCNC: 15 MG/DL (ref 6–24)
BUN/CREAT SERPL: 15 (ref 9–23)
CALCIUM SERPL-MCNC: 9.8 MG/DL (ref 8.7–10.2)
CHLORIDE SERPL-SCNC: 99 MMOL/L (ref 96–106)
CO2 SERPL-SCNC: 27 MMOL/L (ref 20–29)
CREAT SERPL-MCNC: 1 MG/DL (ref 0.57–1)
GLUCOSE SERPL-MCNC: 76 MG/DL (ref 65–99)
POTASSIUM SERPL-SCNC: 3.3 MMOL/L (ref 3.5–5.2)
SODIUM SERPL-SCNC: 141 MMOL/L (ref 134–144)

## 2018-10-25 RX ORDER — POTASSIUM CHLORIDE 750 MG/1
10 TABLET, EXTENDED RELEASE ORAL DAILY
Qty: 30 TAB | Refills: 6 | Status: SHIPPED | OUTPATIENT
Start: 2018-10-25 | End: 2018-10-29 | Stop reason: SDUPTHER

## 2018-10-29 RX ORDER — POTASSIUM CHLORIDE 750 MG/1
10 TABLET, EXTENDED RELEASE ORAL DAILY
Qty: 90 TAB | Refills: 3 | Status: SHIPPED | OUTPATIENT
Start: 2018-10-29 | End: 2019-04-25 | Stop reason: SDUPTHER

## 2018-10-29 NOTE — TELEPHONE ENCOUNTER
Last Visit: 10/24  Next Appt: 4/24  Previous Refill Encounter: 10/24-print status    Requested Prescriptions     Pending Prescriptions Disp Refills    potassium chloride (KLOR-CON) 10 mEq tablet 90 Tab 3     Sig: Take 1 Tab by mouth daily. Take as directed in your letter.

## 2018-12-14 RX ORDER — NORETHINDRONE ACETATE, ETHINYL ESTRADIOL AND FERROUS FUMARATE 1MG-20(24)
1 KIT ORAL DAILY
Qty: 3 DOSE PACK | Refills: 4 | Status: SHIPPED | OUTPATIENT
Start: 2018-12-14 | End: 2020-03-23 | Stop reason: SDUPTHER

## 2018-12-14 NOTE — TELEPHONE ENCOUNTER
Last Visit: 10/24  Next Appt: 4/24  Previous Refill Encounter: 12/6/17-28+11    Requested Prescriptions     Pending Prescriptions Disp Refills    MIBELAS 24 FE 1 mg-20 mcg(24) /75 mg (4) chew 3 Dose Pack 4     Sig: Take 1 Tab by mouth daily.

## 2019-04-24 ENCOUNTER — OFFICE VISIT (OUTPATIENT)
Dept: FAMILY MEDICINE CLINIC | Age: 44
End: 2019-04-24

## 2019-04-24 VITALS
BODY MASS INDEX: 27.09 KG/M2 | HEART RATE: 81 BPM | DIASTOLIC BLOOD PRESSURE: 90 MMHG | RESPIRATION RATE: 16 BRPM | HEIGHT: 62 IN | SYSTOLIC BLOOD PRESSURE: 142 MMHG | WEIGHT: 147.2 LBS | TEMPERATURE: 98.5 F | OXYGEN SATURATION: 97 %

## 2019-04-24 DIAGNOSIS — Z00.00 ROUTINE GENERAL MEDICAL EXAMINATION AT A HEALTH CARE FACILITY: Primary | ICD-10-CM

## 2019-04-24 DIAGNOSIS — Z51.81 ENCOUNTER FOR MEDICATION MONITORING: ICD-10-CM

## 2019-04-24 DIAGNOSIS — I10 ESSENTIAL HYPERTENSION, BENIGN: ICD-10-CM

## 2019-04-24 LAB
BILIRUB UR QL STRIP: NEGATIVE
GLUCOSE UR-MCNC: NEGATIVE MG/DL
KETONES P FAST UR STRIP-MCNC: NEGATIVE MG/DL
PH UR STRIP: 7 [PH] (ref 4.6–8)
PROT UR QL STRIP: NEGATIVE
SP GR UR STRIP: 1.01 (ref 1–1.03)
UA UROBILINOGEN AMB POC: NORMAL (ref 0.2–1)
URINALYSIS CLARITY POC: CLEAR
URINALYSIS COLOR POC: YELLOW
URINE BLOOD POC: NORMAL
URINE LEUKOCYTES POC: NEGATIVE
URINE NITRITES POC: NEGATIVE

## 2019-04-24 NOTE — PROGRESS NOTES
Subjective:  
44 y.o. female for Well Woman Check. Patient's last menstrual period was 04/16/2019. Pap and pelvic done by GYN. HTN follow up: 
Compliant w/ meds, low salt diet, and exercise. Tolerating toprol. No home bp monitoring. No swelling, headache or dizziness. No chest pain, SOB, palpitations. Otherwise feeling well since the last visit. Patient Active Problem List  
Diagnosis Code  Glaucoma H40.9  Essential hypertension, benign I10  
 Encounter for medication monitoring Z51.81 Current Outpatient Medications Medication Sig Dispense Refill  metoprolol succinate (TOPROL-XL) 25 mg XL tablet TAKE 1/2 TABLET BY MOUTH DAILY 45 Tab 3  
 MIBELAS 24 FE 1 mg-20 mcg(24) /75 mg (4) chew Take 1 Tab by mouth daily. 3 Dose Pack 4  potassium chloride (KLOR-CON) 10 mEq tablet Take 1 Tab by mouth daily. Take as directed in your letter. 90 Tab 3  
 hydroCHLOROthiazide (HYDRODIURIL) 25 mg tablet One pills daily as needed for menstrual bloating. 90 Tab 3  
 latanoprost (XALATAN) 0.005 % ophthalmic solution Administer 1 Drop to both eyes nightly. Allergies Allergen Reactions  Sulfa (Sulfonamide Antibiotics) Hives History reviewed. No pertinent past medical history. Past Surgical History:  
Procedure Laterality Date  HX MYOMECTOMY  12/21/2016 Dr. Danny Lynn Family History Problem Relation Age of Onset  Hypertension Mother  Lupus Mother  Heart Disease Mother  Cancer Father   
     lung cancer  Diabetes Sister  Hypertension Sister  Hypertension Brother  Kidney Disease Brother  Hypertension Sister Social History Tobacco Use  Smoking status: Never Smoker  Smokeless tobacco: Never Used Substance Use Topics  Alcohol use: Yes Alcohol/week: 0.0 oz  
  Comment: occasionally Lab Results Component Value Date/Time  WBC 4.9 04/18/2018 04:56 PM  
 HGB 13.9 04/18/2018 04:56 PM  
 HCT 41.1 04/18/2018 04:56 PM  
 PLATELET 754 81/54/6016 04:56 PM  
 MCV 93 04/18/2018 04:56 PM  
 
Lab Results Component Value Date/Time Cholesterol, total 166 04/18/2018 04:56 PM  
 HDL Cholesterol 60 04/18/2018 04:56 PM  
 LDL, calculated 91 04/18/2018 04:56 PM  
 Triglyceride 75 04/18/2018 04:56 PM  
 
Lab Results Component Value Date/Time TSH 1.870 10/18/2017 09:28 AM  
  
Lab Results Component Value Date/Time Sodium 141 10/24/2018 02:48 PM  
 Potassium 3.3 (L) 10/24/2018 02:48 PM  
 Chloride 99 10/24/2018 02:48 PM  
 CO2 27 10/24/2018 02:48 PM  
 Glucose 76 10/24/2018 02:48 PM  
 BUN 15 10/24/2018 02:48 PM  
 Creatinine 1.00 10/24/2018 02:48 PM  
 BUN/Creatinine ratio 15 10/24/2018 02:48 PM  
 GFR est AA 80 10/24/2018 02:48 PM  
 GFR est non-AA 70 10/24/2018 02:48 PM  
 Calcium 9.8 10/24/2018 02:48 PM  
 Bilirubin, total <0.2 04/18/2018 04:56 PM  
 ALT (SGPT) 15 04/18/2018 04:56 PM  
 AST (SGOT) 20 04/18/2018 04:56 PM  
 Alk. phosphatase 76 04/18/2018 04:56 PM  
 Protein, total 7.3 04/18/2018 04:56 PM  
 Albumin 4.4 04/18/2018 04:56 PM  
 A-G Ratio 1.5 04/18/2018 04:56 PM  
  
Lab Results Component Value Date/Time Hemoglobin A1c (POC) 5.7 04/18/2018 04:50 PM  
   
 
 
ROS:  Feeling well. No dyspnea or chest pain on exertion. No abdominal pain, change in bowel habits, black or bloody stools. No urinary tract symptoms. GYN ROS: normal menses, no abnormal bleeding, pelvic pain or discharge, no breast pain or new or enlarging lumps on self exam, no hot flashes. No neurological complaints. Objective:  
 
Visit Vitals /90 Pulse 81 Temp 98.5 °F (36.9 °C) (Oral) Resp 16 Ht 5' 2\" (1.575 m) Wt 147 lb 3.2 oz (66.8 kg) LMP 04/16/2019 SpO2 97% BMI 26.92 kg/m² The patient appears well, alert, oriented x 3, in no distress. ENT normal.  Neck supple. No adenopathy or thyromegaly. KATERINA. Lungs are clear, good air entry, no wheezes, rhonchi or rales.  S1 and S2 normal, no murmurs, regular rate and rhythm. Abdomen soft without tenderness, guarding, mass or organomegaly. Extremities show no edema, normal peripheral pulses. Neurological is normal, no focal findings. BREAST EXAM: breasts appear normal, no suspicious masses, no skin or nipple changes or axillary nodes PELVIC EXAM: examination not indicated Assessment/Plan:  
well woman 
mammogram 
counseled on breast self exam, mammography screening and adequate intake of calcium and vitamin D 
additional lab tests per orders 
return annually or prn Diagnoses and all orders for this visit: 1. Routine general medical examination at a health care facility -     AMB POC URINALYSIS DIP STICK AUTO W/ MICRO 2. Essential hypertension, benign She thinks that her BP is up some today d/t recently learning that her best friend . Bridgett Ackerman is Saturday. She has felt stress all week. Discussed sodium restriction, high k rich diet, maintaining ideal body weight and regular exercise program such as daily walking 30 min perday 4-5 times per week, as physiologic means to achieve blood pressure control.  Medication compliance advised. 3. Encounter for medication monitoring -     METABOLIC PANEL, COMPREHENSIVE 
-     CBC W/O DIFF Follow-up and Dispositions · Return in about 5 months (around 10/1/2019). reviewed diet, exercise and weight control 
cardiovascular risk and specific lipid/LDL goals reviewed 
reviewed medications and side effects in detail. I have discussed diagnosis listed in this note with pt and/or family. I have discussed treatment plans and options and the risk/benefit analysis of those options, including safe use of medications and possible medication side effects. Through the use of shared decision making we have agreed to the above plan. The patient has received an after-visit summary and questions were answered concerning future plans and follow up.   Advise pt of any urgent changes then to proceed to the ER.

## 2019-04-24 NOTE — PROGRESS NOTES
HISTORY OF PRESENT ILLNESS Frandy Meier is a 44 y.o. female. HPI Follow up on BP. Tolerating toprol. HTN follow up: 
Compliant w/ meds, low salt diet, and exercise. No home bp monitoring. No swelling, headache or dizziness. No chest pain, SOB, palpitations. Otherwise feeling well since the last visit. Patient Active Problem List  
Diagnosis Code  Glaucoma H40.9  Essential hypertension, benign I10  
 Encounter for medication monitoring Z51.81 Current Outpatient Medications Medication Sig Dispense Refill  hydroCHLOROthiazide (HYDRODIURIL) 25 mg tablet One pills daily as needed for menstrual bloating. 90 Tab 3  
 MIBELAS 24 FE 1 mg-20 mcg(24) /75 mg (4) chew Take 1 Tab by mouth daily. 3 Dose Pack 4  potassium chloride SR (MICRO K 8) 8 mEq capsule Take 2 Caps by mouth daily. For low potassium 60 Cap 6  
 melatonin 5 mg cap capsule Take 10 mg by mouth nightly as needed.  metoprolol succinate (TOPROL-XL) 25 mg XL tablet Take 0.5 Tabs by mouth daily. 90 Tab 3  
 latanoprost (XALATAN) 0.005 % ophthalmic solution Administer 1 Drop to both eyes nightly. Allergies Allergen Reactions  Sulfa (Sulfonamide Antibiotics) Hives No past medical history on file. Past Surgical History:  
Procedure Laterality Date  HX MYOMECTOMY  12/21/2016 Dr. Kaylyn Fisher Family History Problem Relation Age of Onset  Hypertension Mother  Lupus Mother  Heart Disease Mother  Cancer Father   
     lung cancer  Diabetes Sister  Hypertension Sister  Hypertension Brother  Kidney Disease Brother  Hypertension Sister Social History Tobacco Use  Smoking status: Never Smoker  Smokeless tobacco: Never Used Substance Use Topics  Alcohol use: Yes Alcohol/week: 0.0 oz  
  Comment: occasionally Lab Results Component Value Date/Time  WBC 4.9 04/18/2018 04:56 PM  
 HGB 13.9 04/18/2018 04:56 PM  
 HCT 41.1 04/18/2018 04:56 PM  
 PLATELET 403 10/80/9437 04:56 PM  
 MCV 93 04/18/2018 04:56 PM  
 
Lab Results Component Value Date/Time Cholesterol, total 166 04/18/2018 04:56 PM  
 HDL Cholesterol 60 04/18/2018 04:56 PM  
 LDL, calculated 91 04/18/2018 04:56 PM  
 Triglyceride 75 04/18/2018 04:56 PM  
 
Lab Results Component Value Date/Time ALT (SGPT) 15 04/18/2018 04:56 PM  
 AST (SGOT) 20 04/18/2018 04:56 PM  
 Alk. phosphatase 76 04/18/2018 04:56 PM  
 Bilirubin, total <0.2 04/18/2018 04:56 PM  
 
Lab Results Component Value Date/Time GFR est AA 81 04/18/2018 04:56 PM  
 GFR est non-AA 70 04/18/2018 04:56 PM  
 Creatinine 1.00 04/18/2018 04:56 PM  
 BUN 12 04/18/2018 04:56 PM  
 Sodium 141 04/18/2018 04:56 PM  
 Potassium 3.4 (L) 04/18/2018 04:56 PM  
 Chloride 97 04/18/2018 04:56 PM  
 CO2 26 04/18/2018 04:56 PM  
  
Lab Results Component Value Date/Time Hemoglobin A1c (POC) 5.7 04/18/2018 04:50 PM  
 
Review of Systems Constitutional: Negative for malaise/fatigue. HENT: Negative for congestion. Eyes: Negative for blurred vision. Respiratory: Negative for cough and shortness of breath. Cardiovascular: Negative for chest pain, palpitations and leg swelling. Gastrointestinal: Negative for heartburn, abdominal pain and constipation. Genitourinary: Negative for dysuria, urgency and frequency. Musculoskeletal: Negative for back pain and joint pain. Neurological: Negative for dizziness, tingling and headaches. Endo/Heme/Allergies: Negative for environmental allergies. Psychiatric/Behavioral: Negative for depression. The patient does not have insomnia. Physical Exam  
Constitutional: She appears well-developed and well-nourished. There were no vitals taken for this visit. HENT:  
Right Ear: Tympanic membrane and ear canal normal.  
Left Ear: Tympanic membrane and ear canal normal.  
Nose: No mucosal edema or rhinorrhea. Mouth/Throat: Oropharynx is clear and moist and mucous membranes are normal.  
Neck: Normal range of motion. Neck supple. No thyromegaly present. Cardiovascular: Normal rate and regular rhythm. No murmur heard. Pulmonary/Chest: Effort normal and breath sounds normal.  
Abdominal: Soft. Bowel sounds are normal. There is no tenderness. Musculoskeletal: Normal range of motion. She exhibits no edema. Lymphadenopathy:  
  She has no cervical adenopathy. Skin: Skin is warm and dry. Psychiatric: She has a normal mood and affect. Nursing note and vitals reviewed. ASSESSMENT and PLAN 
{ASSESSMENT/PLAN:[id number]}

## 2019-04-24 NOTE — PATIENT INSTRUCTIONS
Hemorrhoids: Care Instructions Your Care Instructions Hemorrhoids are enlarged veins that develop in the anal canal. Bleeding during bowel movements, itching, swelling, and rectal pain are the most common symptoms. They can be uncomfortable at times, but hemorrhoids rarely are a serious problem. You can treat most hemorrhoids with simple changes to your diet and bowel habits. These changes include eating more fiber and not straining to pass stools. Most hemorrhoids do not need surgery or other treatment unless they are very large and painful or bleed a lot. Follow-up care is a key part of your treatment and safety. Be sure to make and go to all appointments, and call your doctor if you are having problems. It's also a good idea to know your test results and keep a list of the medicines you take. How can you care for yourself at home? · Sit in a few inches of warm water (sitz bath) 3 times a day and after bowel movements. The warm water helps with pain and itching. · Put ice on your anal area several times a day for 10 minutes at a time. Put a thin cloth between the ice and your skin. Follow this by placing a warm, wet towel on the area for another 10 to 20 minutes. · Take pain medicines exactly as directed. ? If the doctor gave you a prescription medicine for pain, take it as prescribed. ? If you are not taking a prescription pain medicine, ask your doctor if you can take an over-the-counter medicine. · Keep the anal area clean, but be gentle. Use water and a fragrance-free soap, such as [place], or use baby wipes or medicated pads, such as Tucks. · Wear cotton underwear and loose clothing to decrease moisture in the anal area. · Eat more fiber. Include foods such as whole-grain breads and cereals, raw vegetables, raw and dried fruits, and beans. · Drink plenty of fluids, enough so that your urine is light yellow or clear like water.  If you have kidney, heart, or liver disease and have to limit fluids, talk with your doctor before you increase the amount of fluids you drink. · Use a stool softener that contains bran or psyllium. You can save money by buying bran or psyllium (available in bulk at most health food stores) and sprinkling it on foods or stirring it into fruit juice. Or you can use a product such as Metamucil or Hydrocil. · Practice healthy bowel habits. ? Go to the bathroom as soon as you have the urge. ? Avoid straining to pass stools. Relax and give yourself time to let things happen naturally. ? Do not hold your breath while passing stools. ? Do not read while sitting on the toilet. Get off the toilet as soon as you have finished. · Take your medicines exactly as prescribed. Call your doctor if you think you are having a problem with your medicine. When should you call for help? Call 911 anytime you think you may need emergency care. For example, call if: 
  · You pass maroon or very bloody stools.  
 Call your doctor now or seek immediate medical care if: 
  · You have increased pain.  
  · You have increased bleeding.  
 Watch closely for changes in your health, and be sure to contact your doctor if: 
  · Your symptoms have not improved after 3 or 4 days. Where can you learn more? Go to http://liz-kristi.info/. Enter F228 in the search box to learn more about \"Hemorrhoids: Care Instructions. \" Current as of: March 27, 2018 Content Version: 11.9 © 8048-7209 [employer]. Care instructions adapted under license by [employer] (which disclaims liability or warranty for this information). If you have questions about a medical condition or this instruction, always ask your healthcare professional. [place] [address] any warranty or liability for your use of this information.

## 2019-04-24 NOTE — PROGRESS NOTES
Chief Complaint Patient presents with  Complete Physical  
  no pap  LMP 4/16/2019 Patient goes to Dr. Gino Cruz for pap and pelvic. Mammogram 7/11/2018. Patient states she went to [facility] and had a mammogram done 1/14/2019 and has to go back in 6 months. 1. Have you been to the ER, urgent care clinic since your last visit? Hospitalized since your last visit? No 
 
2. Have you seen or consulted any other health care providers outside of the [address] [place] [address] since your last visit? Include any pap smears or colon screening. Mammogram 1/14/2019

## 2019-04-25 LAB
ALBUMIN SERPL-MCNC: 4.7 G/DL (ref 3.5–5.5)
ALBUMIN/GLOB SERPL: 1.5 {RATIO} (ref 1.2–2.2)
ALP SERPL-CCNC: 59 IU/L (ref 39–117)
ALT SERPL-CCNC: 23 IU/L (ref 0–32)
AST SERPL-CCNC: 23 IU/L (ref 0–40)
BILIRUB SERPL-MCNC: 0.3 MG/DL (ref 0–1.2)
BUN SERPL-MCNC: 12 MG/DL (ref 6–24)
BUN/CREAT SERPL: 12 (ref 9–23)
CALCIUM SERPL-MCNC: 10.2 MG/DL (ref 8.7–10.2)
CHLORIDE SERPL-SCNC: 98 MMOL/L (ref 96–106)
CO2 SERPL-SCNC: 26 MMOL/L (ref 20–29)
CREAT SERPL-MCNC: 0.98 MG/DL (ref 0.57–1)
ERYTHROCYTE [DISTWIDTH] IN BLOOD BY AUTOMATED COUNT: 13.1 % (ref 12.3–15.4)
GLOBULIN SER CALC-MCNC: 3.1 G/DL (ref 1.5–4.5)
GLUCOSE SERPL-MCNC: 88 MG/DL (ref 65–99)
HCT VFR BLD AUTO: 45.1 % (ref 34–46.6)
HGB BLD-MCNC: 14.8 G/DL (ref 11.1–15.9)
MCH RBC QN AUTO: 31.3 PG (ref 26.6–33)
MCHC RBC AUTO-ENTMCNC: 32.8 G/DL (ref 31.5–35.7)
MCV RBC AUTO: 95 FL (ref 79–97)
PLATELET # BLD AUTO: 209 X10E3/UL (ref 150–379)
POTASSIUM SERPL-SCNC: 3.4 MMOL/L (ref 3.5–5.2)
PROT SERPL-MCNC: 7.8 G/DL (ref 6–8.5)
RBC # BLD AUTO: 4.73 X10E6/UL (ref 3.77–5.28)
SODIUM SERPL-SCNC: 142 MMOL/L (ref 134–144)
WBC # BLD AUTO: 4.8 X10E3/UL (ref 3.4–10.8)

## 2019-04-25 RX ORDER — POTASSIUM CHLORIDE 750 MG/1
10 TABLET, EXTENDED RELEASE ORAL DAILY
Qty: 90 TAB | Refills: 3 | Status: SHIPPED | OUTPATIENT
Start: 2019-04-25 | End: 2020-08-27 | Stop reason: SDUPTHER

## 2019-11-18 ENCOUNTER — OFFICE VISIT (OUTPATIENT)
Dept: FAMILY MEDICINE CLINIC | Age: 44
End: 2019-11-18

## 2019-11-18 VITALS
WEIGHT: 158.8 LBS | DIASTOLIC BLOOD PRESSURE: 95 MMHG | HEIGHT: 62 IN | SYSTOLIC BLOOD PRESSURE: 159 MMHG | HEART RATE: 75 BPM | TEMPERATURE: 97.7 F | OXYGEN SATURATION: 96 % | BODY MASS INDEX: 29.22 KG/M2 | RESPIRATION RATE: 18 BRPM

## 2019-11-18 DIAGNOSIS — I10 ESSENTIAL HYPERTENSION, BENIGN: Primary | ICD-10-CM

## 2019-11-18 DIAGNOSIS — Z51.81 ENCOUNTER FOR MEDICATION MONITORING: ICD-10-CM

## 2019-11-18 RX ORDER — HYDROCHLOROTHIAZIDE 25 MG/1
25 TABLET ORAL DAILY
Qty: 90 TAB | Refills: 1 | Status: SHIPPED | OUTPATIENT
Start: 2019-11-18 | End: 2019-11-18 | Stop reason: SDUPTHER

## 2019-11-18 RX ORDER — AMLODIPINE BESYLATE 5 MG/1
5 TABLET ORAL DAILY
Qty: 30 TAB | Refills: 3 | Status: SHIPPED | OUTPATIENT
Start: 2019-11-18 | End: 2020-02-10

## 2019-11-18 RX ORDER — HYDROCHLOROTHIAZIDE 25 MG/1
25 TABLET ORAL DAILY
Qty: 90 TAB | Refills: 1 | Status: SHIPPED | OUTPATIENT
Start: 2019-11-18 | End: 2020-08-06 | Stop reason: SDUPTHER

## 2019-11-18 RX ORDER — METOPROLOL SUCCINATE 25 MG/1
TABLET, EXTENDED RELEASE ORAL
Qty: 45 TAB | Refills: 3 | Status: SHIPPED | OUTPATIENT
Start: 2019-11-18 | End: 2020-01-08 | Stop reason: ALTCHOICE

## 2019-11-18 NOTE — PROGRESS NOTES
HISTORY OF PRESENT ILLNESS  Ivelisse Gramajo is a 38 y.o. female. HPI   Follow up on BP. BP has been high over the past several weeks. Has been having more headaches and thinks d/t increase stress with being in school. Cardiovascular Review:  The patient has hypertension. Diet and Lifestyle: generally follows a low fat low cholesterol diet, generally follows a low sodium diet, exercises sporadically, alcohol intake drinking wine at night to help her realx. thisk that it has caused some of the weight gain. Home BP Monitoring: is not measured at home. Pertinent ROS: taking medications as instructed, no medication side effects noted, no TIA's, no chest pain on exertion, no dyspnea on exertion, no swelling of ankles, no palpitations. Wt Readings from Last 3 Encounters:   11/18/19 158 lb 12.8 oz (72 kg)   04/24/19 147 lb 3.2 oz (66.8 kg)   10/24/18 150 lb 9.6 oz (68.3 kg)       Patient Active Problem List   Diagnosis Code    Glaucoma H40.9    Essential hypertension, benign I10    Encounter for medication monitoring Z51.81     Current Outpatient Medications   Medication Sig Dispense Refill    amLODIPine (NORVASC) 5 mg tablet Take 1 Tab by mouth daily. 30 Tab 3    hydroCHLOROthiazide (HYDRODIURIL) 25 mg tablet Take 1 Tab by mouth daily. 90 Tab 1    metoprolol succinate (TOPROL-XL) 25 mg XL tablet TAKE 1/2 TABLET BY MOUTH MONDAY, WEDNESDAY, FRIDAY OF THIS WEEK AND THEN STOP MEDICATION 45 Tab 3    KLOR-CON M10 10 mEq tablet TAKE 1 TAB BY MOUTH DAILY. TAKE AS DIRECTED IN YOUR LETTER. 90 Tab 3    potassium chloride (KLOR-CON) 10 mEq tablet Take 1 Tab by mouth daily. 90 Tab 3    MIBELAS 24 FE 1 mg-20 mcg(24) /75 mg (4) chew Take 1 Tab by mouth daily. 3 Dose Pack 4    latanoprost (XALATAN) 0.005 % ophthalmic solution Administer 1 Drop to both eyes nightly. Allergies   Allergen Reactions    Sulfa (Sulfonamide Antibiotics) Hives     No past medical history on file.   Past Surgical History:   Procedure Laterality Date    HX MYOMECTOMY  12/21/2016    Dr. Anastasiya Vick     Family History   Problem Relation Age of Onset    Hypertension Mother     Lupus Mother     Heart Disease Mother     Cancer Father         lung cancer    Diabetes Sister     Hypertension Sister     Hypertension Brother     Kidney Disease Brother     Hypertension Sister      Social History     Tobacco Use    Smoking status: Never Smoker    Smokeless tobacco: Never Used   Substance Use Topics    Alcohol use: Yes     Alcohol/week: 0.0 standard drinks     Comment: occasionally      Lab Results   Component Value Date/Time    WBC 4.8 04/24/2019 02:20 PM    HGB 14.8 04/24/2019 02:20 PM    HCT 45.1 04/24/2019 02:20 PM    PLATELET 762 54/95/5930 02:20 PM    MCV 95 04/24/2019 02:20 PM     Lab Results   Component Value Date/Time    Cholesterol, total 166 04/18/2018 04:56 PM    HDL Cholesterol 60 04/18/2018 04:56 PM    LDL, calculated 91 04/18/2018 04:56 PM    Triglyceride 75 04/18/2018 04:56 PM     Lab Results   Component Value Date/Time    TSH 1.870 10/18/2017 09:28 AM      Lab Results   Component Value Date/Time    Sodium 142 04/24/2019 02:20 PM    Potassium 3.4 (L) 04/24/2019 02:20 PM    Chloride 98 04/24/2019 02:20 PM    CO2 26 04/24/2019 02:20 PM    Glucose 88 04/24/2019 02:20 PM    BUN 12 04/24/2019 02:20 PM    Creatinine 0.98 04/24/2019 02:20 PM    BUN/Creatinine ratio 12 04/24/2019 02:20 PM    GFR est AA 82 04/24/2019 02:20 PM    GFR est non-AA 71 04/24/2019 02:20 PM    Calcium 10.2 04/24/2019 02:20 PM    Bilirubin, total 0.3 04/24/2019 02:20 PM    ALT (SGPT) 23 04/24/2019 02:20 PM    AST (SGOT) 23 04/24/2019 02:20 PM    Alk. phosphatase 59 04/24/2019 02:20 PM    Protein, total 7.8 04/24/2019 02:20 PM    Albumin 4.7 04/24/2019 02:20 PM    A-G Ratio 1.5 04/24/2019 02:20 PM      Lab Results   Component Value Date/Time    Hemoglobin A1c (POC) 5.7 04/18/2018 04:50 PM         Review of Systems   Constitutional: Negative for malaise/fatigue. HENT: Negative for congestion. Eyes: Negative for blurred vision. Respiratory: Negative for cough and shortness of breath. Cardiovascular: Negative for chest pain, palpitations and leg swelling. Gastrointestinal: Negative for abdominal pain, constipation and heartburn. Genitourinary: Negative for dysuria, frequency and urgency. Musculoskeletal: Negative for back pain and joint pain. Neurological: Negative for dizziness, tingling and headaches. Endo/Heme/Allergies: Negative for environmental allergies. Psychiatric/Behavioral: Negative for depression. The patient does not have insomnia. Physical Exam   Constitutional: She appears well-developed and well-nourished. BP (!) 159/95 (BP 1 Location: Left arm, BP Patient Position: Sitting)   Pulse 75   Temp 97.7 °F (36.5 °C) (Oral)   Resp 18   Ht 5' 2\" (1.575 m)   Wt 158 lb 12.8 oz (72 kg)   LMP 10/31/2019   SpO2 96%   BMI 29.04 kg/m²    HENT:   Right Ear: Tympanic membrane and ear canal normal.   Left Ear: Tympanic membrane and ear canal normal.   Nose: No mucosal edema or rhinorrhea. Mouth/Throat: Oropharynx is clear and moist and mucous membranes are normal.   Neck: Normal range of motion. Neck supple. No thyromegaly present. Cardiovascular: Normal rate, regular rhythm and normal heart sounds. Exam reveals no gallop. Pulmonary/Chest: Effort normal and breath sounds normal.   Abdominal: Soft. Normal appearance and bowel sounds are normal. She exhibits no mass. There is no tenderness. Musculoskeletal: Normal range of motion. She exhibits no edema. Lymphadenopathy:     She has no cervical adenopathy. Skin: Skin is warm and dry. Psychiatric: She has a normal mood and affect. Nursing note and vitals reviewed. ASSESSMENT and PLAN  Diagnoses and all orders for this visit:    1. Essential hypertension, benign  -     Add hydroCHLOROthiazide (HYDRODIURIL) 25 mg tablet; Take 1 Tab by mouth to take daily now.   -     Taper off of metoprolol succinate (TOPROL-XL) 25 mg XL tablet; TAKE 1/2 TABLET BY MOUTH MONDAY, WEDNESDAY, FRIDAY OF THIS WEEK AND THEN STOP MEDICATION  Discussed sodium restriction, high k rich diet, maintaining ideal body weight and regular exercise program such as daily walking 30 min perday 4-5 times per week, as physiologic means to achieve blood pressure control.  Medication compliance advised. 2. Encounter for medication monitoring    Other orders  -     Add amLODIPine (NORVASC) 5 mg tablet; Take 1 Tab by mouth daily. Discussed weight loss options, diet and exercise. Also reviewed health issues related to obesity. Initial goal of weight loss 10% of current weight. Counseled on goal for exercise of eventual goal of 30-60 minutes 5-7 times a week as per AHA guidelines. Decrease carbohydrates (white foods, sweet foods, sweet drinks and alcohol), increase green leafy vegetables and protein (lean meats and beans). Avoid fried foods. Increase water intake. Eat 3-5 small meals daily. Increase physical activity. Follow-up and Dispositions    · Return in about 1 month (around 12/18/2019). reviewed diet, exercise and weight control  cardiovascular risk and specific lipid/LDL goals reviewed  reviewed medications and side effects in detail    I have discussed diagnosis listed in this note with pt and/or family. I have discussed treatment plans and options and the risk/benefit analysis of those options, including safe use of medications and possible medication side effects. Through the use of shared decision making we have agreed to the above plan. The patient has received an after-visit summary and questions were answered concerning future plans and follow up. Advise pt of any urgent changes then to proceed to the ER.

## 2019-11-18 NOTE — PROGRESS NOTES
Chief Complaint   Patient presents with    Follow-up     Hypertension     1. Have you been to the ER, urgent care clinic since your last visit? Hospitalized since your last visit? Yes Brian 10/01/19 Elevated blood pressure with headaches. 2. Have you seen or consulted any other health care providers outside of the [address] [place] [address] since your last visit? Include any pap smears or colon screening.  No.    Mammogram 1/14/19  PAP 4/18/18  HA1C 4/18/18

## 2020-01-08 ENCOUNTER — OFFICE VISIT (OUTPATIENT)
Dept: FAMILY MEDICINE CLINIC | Age: 45
End: 2020-01-08

## 2020-01-08 VITALS
BODY MASS INDEX: 28.49 KG/M2 | TEMPERATURE: 97.8 F | SYSTOLIC BLOOD PRESSURE: 136 MMHG | WEIGHT: 154.8 LBS | DIASTOLIC BLOOD PRESSURE: 82 MMHG | RESPIRATION RATE: 16 BRPM | HEART RATE: 86 BPM | HEIGHT: 62 IN | OXYGEN SATURATION: 98 %

## 2020-01-08 DIAGNOSIS — Z51.81 ENCOUNTER FOR MEDICATION MONITORING: ICD-10-CM

## 2020-01-08 DIAGNOSIS — Z13.1 SCREENING FOR DIABETES MELLITUS: ICD-10-CM

## 2020-01-08 DIAGNOSIS — I10 ESSENTIAL HYPERTENSION, BENIGN: Primary | ICD-10-CM

## 2020-01-08 LAB — HBA1C MFR BLD HPLC: 5.5 %

## 2020-01-08 NOTE — PROGRESS NOTES
Chief Complaint   Patient presents with    Hypertension     6 week follow up    Medication Evaluation     6 week follow up HCTZ 25mg 1 tab daily and Norvasc 5mg 1 tab daily             1. Have you been to the ER, urgent care clinic since your last visit? Hospitalized since your last visit? No    2. Have you seen or consulted any other health care providers outside of the [address] [place] [address] since your last visit? Include any pap smears or colon screening.  No

## 2020-01-08 NOTE — PROGRESS NOTES
HISTORY OF PRESENT ILLNESS  Mario Guerra is a 38 y.o. female. HPI   Follow up on BP. BP has been better with adding the Norvasc. She has been exercising more and has cut back on the wince that she had been drinking at night. Cardiovascular Review:  The patient has hypertension. Diet and Lifestyle: generally follows a low fat low cholesterol diet, generally follows a low sodium diet, exercises sporadically. Home BP Monitorins/70-80s. Pertinent ROS: taking medications as instructed, no medication side effects noted, no TIA's, no chest pain on exertion, no dyspnea on exertion, no swelling of ankles, no palpitations. Wt Readings from Last 3 Encounters:   19 158 lb 12.8 oz (72 kg)   19 147 lb 3.2 oz (66.8 kg)   10/24/18 150 lb 9.6 oz (68.3 kg)       Patient Active Problem List   Diagnosis Code    Glaucoma H40.9    Essential hypertension, benign I10    Encounter for medication monitoring Z51.81     Current Outpatient Medications   Medication Sig Dispense Refill    amLODIPine (NORVASC) 5 mg tablet Take 1 Tab by mouth daily. 30 Tab 3    hydroCHLOROthiazide (HYDRODIURIL) 25 mg tablet Take 1 Tab by mouth daily. 90 Tab 1    metoprolol succinate (TOPROL-XL) 25 mg XL tablet TAKE 1/2 TABLET BY MOUTH MONDAY, WEDNESDAY, FRIDAY OF THIS WEEK AND THEN STOP MEDICATION 45 Tab 3    potassium chloride (KLOR-CON) 10 mEq tablet Take 1 Tab by mouth daily. 90 Tab 3    MIBELAS 24 FE 1 mg-20 mcg(24) /75 mg (4) chew Take 1 Tab by mouth daily. 3 Dose Pack 4    latanoprost (XALATAN) 0.005 % ophthalmic solution Administer 1 Drop to both eyes nightly. Allergies   Allergen Reactions    Sulfa (Sulfonamide Antibiotics) Hives     No past medical history on file.   Past Surgical History:   Procedure Laterality Date    HX MYOMECTOMY  2016    Dr. Amanda Bryant     Family History   Problem Relation Age of Onset    Hypertension Mother     Lupus Mother     Heart Disease Mother     Cancer Father lung cancer    Diabetes Sister     Hypertension Sister     Hypertension Brother     Kidney Disease Brother     Hypertension Sister      Social History     Tobacco Use    Smoking status: Never Smoker    Smokeless tobacco: Never Used   Substance Use Topics    Alcohol use: Yes     Alcohol/week: 0.0 standard drinks     Comment: occasionally      Lab Results   Component Value Date/Time    WBC 4.8 04/24/2019 02:20 PM    HGB 14.8 04/24/2019 02:20 PM    HCT 45.1 04/24/2019 02:20 PM    PLATELET 493 00/17/5751 02:20 PM    MCV 95 04/24/2019 02:20 PM     Lab Results   Component Value Date/Time    Cholesterol, total 166 04/18/2018 04:56 PM    HDL Cholesterol 60 04/18/2018 04:56 PM    LDL, calculated 91 04/18/2018 04:56 PM    Triglyceride 75 04/18/2018 04:56 PM     Lab Results   Component Value Date/Time    TSH 1.870 10/18/2017 09:28 AM      Lab Results   Component Value Date/Time    Sodium 142 04/24/2019 02:20 PM    Potassium 3.4 (L) 04/24/2019 02:20 PM    Chloride 98 04/24/2019 02:20 PM    CO2 26 04/24/2019 02:20 PM    Glucose 88 04/24/2019 02:20 PM    BUN 12 04/24/2019 02:20 PM    Creatinine 0.98 04/24/2019 02:20 PM    BUN/Creatinine ratio 12 04/24/2019 02:20 PM    GFR est AA 82 04/24/2019 02:20 PM    GFR est non-AA 71 04/24/2019 02:20 PM    Calcium 10.2 04/24/2019 02:20 PM    Bilirubin, total 0.3 04/24/2019 02:20 PM    ALT (SGPT) 23 04/24/2019 02:20 PM    AST (SGOT) 23 04/24/2019 02:20 PM    Alk. phosphatase 59 04/24/2019 02:20 PM    Protein, total 7.8 04/24/2019 02:20 PM    Albumin 4.7 04/24/2019 02:20 PM    A-G Ratio 1.5 04/24/2019 02:20 PM      Lab Results   Component Value Date/Time    Hemoglobin A1c (POC) 5.7 04/18/2018 04:50 PM         Review of Systems   Constitutional: Negative for malaise/fatigue. HENT: Negative for congestion. Eyes: Negative for blurred vision. Respiratory: Negative for cough and shortness of breath. Cardiovascular: Negative for chest pain, palpitations and leg swelling. Gastrointestinal: Negative for abdominal pain, constipation and heartburn. Genitourinary: Negative for dysuria, frequency and urgency. Musculoskeletal: Negative for back pain and joint pain. Neurological: Negative for dizziness, tingling and headaches. Endo/Heme/Allergies: Negative for environmental allergies. Psychiatric/Behavioral: Negative for depression. The patient does not have insomnia. Physical Exam   Constitutional: She appears well-developed and well-nourished. /82   Pulse 86   Temp 97.8 °F (36.6 °C) (Oral)   Resp 16   Ht 5' 2\" (1.575 m)   Wt 154 lb 12.8 oz (70.2 kg)   LMP 12/24/2019   SpO2 98%   BMI 28.31 kg/m²      HENT:   Right Ear: Tympanic membrane and ear canal normal.   Left Ear: Tympanic membrane and ear canal normal.   Nose: No mucosal edema or rhinorrhea. Mouth/Throat: Oropharynx is clear and moist and mucous membranes are normal.   Neck: Normal range of motion. Neck supple. No thyromegaly present. Cardiovascular: Normal rate, regular rhythm and normal heart sounds. Exam reveals no gallop. Pulmonary/Chest: Effort normal and breath sounds normal.   Abdominal: Soft. Normal appearance and bowel sounds are normal. She exhibits no mass. There is no tenderness. Musculoskeletal: Normal range of motion. General: No edema. Lymphadenopathy:     She has no cervical adenopathy. Skin: Skin is warm and dry. Psychiatric: She has a normal mood and affect. Nursing note and vitals reviewed. ASSESSMENT and PLAN  Diagnoses and all orders for this visit:    1. Essential hypertension, benign  Discussed sodium restriction, high k rich diet, maintaining ideal body weight and regular exercise program such as daily walking 30 min perday 4-5 times per week, as physiologic means to achieve blood pressure control.  Medication compliance advised. -     LIPID PANEL    2.  Encounter for medication monitoring  -     CBC W/O DIFF  -     METABOLIC PANEL, COMPREHENSIVE    3. Screening for diabetes mellitus  -     AMB POC HEMOGLOBIN A1C      Follow-up and Dispositions    · Return in about 5 months (around 6/8/2020). reviewed diet, exercise and weight control  cardiovascular risk and specific lipid/LDL goals reviewed  reviewed medications and side effects in detail    I have discussed diagnosis listed in this note with pt and/or family. I have discussed treatment plans and options and the risk/benefit analysis of those options, including safe use of medications and possible medication side effects. Through the use of shared decision making we have agreed to the above plan. The patient has received an after-visit summary and questions were answered concerning future plans and follow up. Advise pt of any urgent changes then to proceed to the ER.

## 2020-01-09 LAB
ALBUMIN SERPL-MCNC: 4.5 G/DL (ref 3.5–5.5)
ALBUMIN/GLOB SERPL: 1.5 {RATIO} (ref 1.2–2.2)
ALP SERPL-CCNC: 65 IU/L (ref 39–117)
ALT SERPL-CCNC: 18 IU/L (ref 0–32)
AST SERPL-CCNC: 17 IU/L (ref 0–40)
BILIRUB SERPL-MCNC: 0.4 MG/DL (ref 0–1.2)
BUN SERPL-MCNC: 11 MG/DL (ref 6–24)
BUN/CREAT SERPL: 12 (ref 9–23)
CALCIUM SERPL-MCNC: 10 MG/DL (ref 8.7–10.2)
CHLORIDE SERPL-SCNC: 97 MMOL/L (ref 96–106)
CHOLEST SERPL-MCNC: 196 MG/DL (ref 100–199)
CO2 SERPL-SCNC: 26 MMOL/L (ref 20–29)
CREAT SERPL-MCNC: 0.91 MG/DL (ref 0.57–1)
ERYTHROCYTE [DISTWIDTH] IN BLOOD BY AUTOMATED COUNT: 12.5 % (ref 11.7–15.4)
GLOBULIN SER CALC-MCNC: 3.1 G/DL (ref 1.5–4.5)
GLUCOSE SERPL-MCNC: 80 MG/DL (ref 65–99)
HCT VFR BLD AUTO: 40.8 % (ref 34–46.6)
HDLC SERPL-MCNC: 70 MG/DL
HGB BLD-MCNC: 14.1 G/DL (ref 11.1–15.9)
INTERPRETATION, 910389: NORMAL
LDLC SERPL CALC-MCNC: 115 MG/DL (ref 0–99)
MCH RBC QN AUTO: 32 PG (ref 26.6–33)
MCHC RBC AUTO-ENTMCNC: 34.6 G/DL (ref 31.5–35.7)
MCV RBC AUTO: 93 FL (ref 79–97)
PLATELET # BLD AUTO: 203 X10E3/UL (ref 150–450)
POTASSIUM SERPL-SCNC: 3.2 MMOL/L (ref 3.5–5.2)
PROT SERPL-MCNC: 7.6 G/DL (ref 6–8.5)
RBC # BLD AUTO: 4.41 X10E6/UL (ref 3.77–5.28)
SODIUM SERPL-SCNC: 142 MMOL/L (ref 134–144)
TRIGL SERPL-MCNC: 54 MG/DL (ref 0–149)
VLDLC SERPL CALC-MCNC: 11 MG/DL (ref 5–40)
WBC # BLD AUTO: 5.3 X10E3/UL (ref 3.4–10.8)

## 2020-02-19 ENCOUNTER — TELEPHONE (OUTPATIENT)
Dept: FAMILY MEDICINE CLINIC | Age: 45
End: 2020-02-19

## 2020-02-19 NOTE — TELEPHONE ENCOUNTER
----- Message from Leelee Layton sent at 2/19/2020  3:15 PM EST -----  Regarding: Dr.Jordan Kelly/Telephone  General Message/Vendor Calls    Caller's first and last name:      Reason for call:  Vaccines and other medical records     Callback required yes/no and why:  Yes, to let her know how [facility] can get these records.      Best contact number(s):  [phone]    Details to clarify the request:      Leelee Layton

## 2020-03-23 RX ORDER — NORETHINDRONE ACETATE, ETHINYL ESTRADIOL AND FERROUS FUMARATE 1MG-20(24)
1 KIT ORAL DAILY
Qty: 3 DOSE PACK | Refills: 4 | Status: SHIPPED | OUTPATIENT
Start: 2020-03-23 | End: 2020-06-23 | Stop reason: DRUGHIGH

## 2020-03-23 NOTE — TELEPHONE ENCOUNTER
Last visit:1/8/20  Next visit:6/15/20  Previous refill 12/14/18( 3 dose pack+4R)    Requested Prescriptions     Pending Prescriptions Disp Refills    Mibelas 24 Fe 1 mg-20 mcg(24) /75 mg (4) chew 3 Dose Pack 4     Sig: Take 1 Tab by mouth daily.

## 2020-06-15 ENCOUNTER — TELEPHONE (OUTPATIENT)
Dept: FAMILY MEDICINE CLINIC | Age: 45
End: 2020-06-15

## 2020-06-15 NOTE — TELEPHONE ENCOUNTER
----- Message from Larayne Meckel sent at 6/15/2020  2:15 PM EDT -----  Regarding: Dr. Vane Galvez first and last name: N/A  Reason for call: Pt would like to r/s her in office appt that was scheduled for today at 3pm. Pt did not have a preference on a specific day to r/s, but she did prefer any appt after 3pm if possible.    Callback required yes/no and why: Yes  Best contact number(s): [phone]  Details to clarify the request: N/A

## 2020-06-23 ENCOUNTER — TELEPHONE (OUTPATIENT)
Dept: FAMILY MEDICINE CLINIC | Age: 45
End: 2020-06-23

## 2020-06-23 RX ORDER — NORETHINDRONE ACETATE AND ETHINYL ESTRADIOL 1MG-20(21)
1 KIT ORAL DAILY
Qty: 1 PACKAGE | Refills: 6 | Status: SHIPPED | OUTPATIENT
Start: 2020-06-23 | End: 2020-08-25 | Stop reason: ALTCHOICE

## 2020-08-25 ENCOUNTER — OFFICE VISIT (OUTPATIENT)
Dept: FAMILY MEDICINE CLINIC | Age: 45
End: 2020-08-25
Payer: COMMERCIAL

## 2020-08-25 VITALS
HEART RATE: 84 BPM | WEIGHT: 154 LBS | BODY MASS INDEX: 28.34 KG/M2 | RESPIRATION RATE: 16 BRPM | SYSTOLIC BLOOD PRESSURE: 134 MMHG | OXYGEN SATURATION: 97 % | HEIGHT: 62 IN | TEMPERATURE: 96.9 F | DIASTOLIC BLOOD PRESSURE: 80 MMHG

## 2020-08-25 DIAGNOSIS — Z51.81 ENCOUNTER FOR MEDICATION MONITORING: ICD-10-CM

## 2020-08-25 DIAGNOSIS — I10 ESSENTIAL HYPERTENSION, BENIGN: Primary | ICD-10-CM

## 2020-08-25 PROCEDURE — 99212 OFFICE O/P EST SF 10 MIN: CPT | Performed by: FAMILY MEDICINE

## 2020-08-25 RX ORDER — NORETHINDRONE ACETATE, ETHINYL ESTRADIOL AND FERROUS FUMARATE 1MG-20(24)
KIT ORAL
COMMUNITY
Start: 2020-07-10 | End: 2021-04-22

## 2020-08-25 RX ORDER — TIMOLOL MALEATE 5 MG/ML
SOLUTION OPHTHALMIC
COMMUNITY
Start: 2020-07-17

## 2020-08-25 NOTE — PROGRESS NOTES
HISTORY OF PRESENT ILLNESS  Kashmir Travis is a 38 y.o. female. HPI   Follow up on BP. BP has been better with adding the Norvasc. She has been exercising more and has cut back on the wince that she had been drinking at night. 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 Cardiovascular Review:  The patient has hypertension. Diet and Lifestyle: generally follows a low fat low cholesterol diet, generally follows a low sodium diet, exercises sporadically. Home BP Monitorins/70-80s. Pertinent ROS: taking medications as instructed, no medication side effects noted, no TIA's, no chest pain on exertion, no dyspnea on exertion, no swelling of ankles, no palpitations. Patient Active Problem List   Diagnosis Code    Glaucoma H40.9    Essential hypertension, benign I10    Encounter for medication monitoring Z51.81       Current Outpatient Medications   Medication Sig Dispense Refill    hydroCHLOROthiazide (HYDRODIURIL) 25 mg tablet Take 1 Tab by mouth daily. 90 Tab 3    norethindrone-ethinyl estradiol (Loestrin Fe , 28-Day,) 1 mg-20 mcg (21)/75 mg (7) tab Take 1 Tab by mouth daily. 1 Package 6    amLODIPine (NORVASC) 5 mg tablet TAKE 1 TABLET BY MOUTH EVERY DAY 90 Tab 3    potassium chloride (KLOR-CON) 10 mEq tablet Take 1 Tab by mouth daily. 90 Tab 3    latanoprost (XALATAN) 0.005 % ophthalmic solution Administer 1 Drop to both eyes nightly. Allergies   Allergen Reactions    Sulfa (Sulfonamide Antibiotics) Hives       No past medical history on file.     Past Surgical History:   Procedure Laterality Date    HX MYOMECTOMY  2016    Dr. Sarahann Boxer       Family History   Problem Relation Age of Onset    Hypertension Mother     Lupus Mother     Heart Disease Mother     Cancer Father         lung cancer    Diabetes Sister     Hypertension Sister     Hypertension Brother     Kidney Disease Brother     Hypertension Sister        Social History     Tobacco Use    Smoking status: Never Smoker    Smokeless tobacco: Never Used   Substance Use Topics    Alcohol use: Yes     Alcohol/week: 0.0 standard drinks     Comment: occasionally     Lab Results   Component Value Date/Time    WBC 5.3 01/08/2020 03:59 PM    HGB 14.1 01/08/2020 03:59 PM    HCT 40.8 01/08/2020 03:59 PM    PLATELET 057 39/22/3158 03:59 PM    MCV 93 01/08/2020 03:59 PM     Lab Results   Component Value Date/Time    Cholesterol, total 196 01/08/2020 03:59 PM    HDL Cholesterol 70 01/08/2020 03:59 PM    LDL, calculated 115 (H) 01/08/2020 03:59 PM    Triglyceride 54 01/08/2020 03:59 PM     Lab Results   Component Value Date/Time    TSH 1.870 10/18/2017 09:28 AM      Lab Results   Component Value Date/Time    Sodium 142 01/08/2020 03:59 PM    Potassium 3.2 (L) 01/08/2020 03:59 PM    Chloride 97 01/08/2020 03:59 PM    CO2 26 01/08/2020 03:59 PM    Glucose 80 01/08/2020 03:59 PM    BUN 11 01/08/2020 03:59 PM    Creatinine 0.91 01/08/2020 03:59 PM    BUN/Creatinine ratio 12 01/08/2020 03:59 PM    GFR est AA 89 01/08/2020 03:59 PM    GFR est non-AA 78 01/08/2020 03:59 PM    Calcium 10.0 01/08/2020 03:59 PM    Bilirubin, total 0.4 01/08/2020 03:59 PM    ALT (SGPT) 18 01/08/2020 03:59 PM    Alk. phosphatase 65 01/08/2020 03:59 PM    Protein, total 7.6 01/08/2020 03:59 PM    Albumin 4.5 01/08/2020 03:59 PM    A-G Ratio 1.5 01/08/2020 03:59 PM      Lab Results   Component Value Date/Time    Hemoglobin A1c (POC) 5.5 01/08/2020 03:59 PM         Review of Systems   Constitutional: Negative for malaise/fatigue. HENT: Negative for congestion. Eyes: Negative for blurred vision. Respiratory: Negative for cough and shortness of breath. Cardiovascular: Negative for chest pain, palpitations and leg swelling. Gastrointestinal: Negative for abdominal pain, constipation and heartburn. Genitourinary: Negative for dysuria, frequency and urgency. Musculoskeletal: Negative for back pain and joint pain.    Neurological: Negative for dizziness, tingling and headaches. Endo/Heme/Allergies: Negative for environmental allergies. Psychiatric/Behavioral: Negative for depression. The patient does not have insomnia. Physical Exam  Vitals signs and nursing note reviewed. Constitutional:       Appearance: Normal appearance. She is well-developed. Comments: /80   Pulse 84   Temp 96.9 °F (36.1 °C) (Temporal)   Resp 16   Ht 5' 2\" (1.575 m)   Wt 154 lb (69.9 kg)   LMP 08/06/2020   SpO2 97%   BMI 28.17 kg/m²    HENT:      Right Ear: Tympanic membrane and ear canal normal.      Left Ear: Tympanic membrane and ear canal normal.      Nose: No mucosal edema or rhinorrhea. Neck:      Musculoskeletal: Normal range of motion and neck supple. Thyroid: No thyromegaly. Cardiovascular:      Rate and Rhythm: Normal rate and regular rhythm. Heart sounds: Normal heart sounds. No gallop. Pulmonary:      Effort: Pulmonary effort is normal.      Breath sounds: Normal breath sounds. Abdominal:      General: Bowel sounds are normal.      Palpations: Abdomen is soft. There is no mass. Tenderness: There is no abdominal tenderness. Musculoskeletal: Normal range of motion. General: No swelling. Lymphadenopathy:      Cervical: No cervical adenopathy. Skin:     General: Skin is warm and dry. Neurological:      General: No focal deficit present. Mental Status: She is alert and oriented to person, place, and time. Psychiatric:         Mood and Affect: Mood normal.         ASSESSMENT and PLAN  Diagnoses and all orders for this visit:    1. Essential hypertension, benign  Discussed sodium restriction, high k rich diet, maintaining ideal body weight and regular exercise program such as daily walking 30 min perday 4-5 times per week, as physiologic means to achieve blood pressure control.  Medication compliance advised.      2. Encounter for medication monitoring  -     METABOLIC PANEL, BASIC      Follow-up and Dispositions    · Return in about 6 months (around 2/25/2021). reviewed diet, exercise and weight control  cardiovascular risk and specific lipid/LDL goals reviewed  reviewed medications and side effects in detail    I have discussed diagnosis listed in this note with pt and/or family. I have discussed treatment plans and options and the risk/benefit analysis of those options, including safe use of medications and possible medication side effects. Through the use of shared decision making we have agreed to the above plan. The patient has received an after-visit summary and questions were answered concerning future plans and follow up. Advise pt of any urgent changes then to proceed to the ER.

## 2020-08-25 NOTE — PROGRESS NOTES
Chief Complaint   Patient presents with    Hypertension     follow up         Patient states she had a mammogram 7/2020 at [address]    1. Have you been to the ER, urgent care clinic since your last visit? Hospitalized since your last visit? No    2. Have you seen or consulted any other health care providers outside of the [address] [place] [address] since your last visit? Include any pap smears or colon screening.  No

## 2020-08-26 LAB
BUN SERPL-MCNC: 18 MG/DL (ref 6–24)
BUN/CREAT SERPL: 17 (ref 9–23)
CALCIUM SERPL-MCNC: 9.6 MG/DL (ref 8.7–10.2)
CHLORIDE SERPL-SCNC: 96 MMOL/L (ref 96–106)
CO2 SERPL-SCNC: 28 MMOL/L (ref 20–29)
CREAT SERPL-MCNC: 1.03 MG/DL (ref 0.57–1)
GLUCOSE SERPL-MCNC: 89 MG/DL (ref 65–99)
POTASSIUM SERPL-SCNC: 3.1 MMOL/L (ref 3.5–5.2)
SODIUM SERPL-SCNC: 138 MMOL/L (ref 134–144)

## 2020-08-27 RX ORDER — POTASSIUM CHLORIDE 750 MG/1
20 TABLET, EXTENDED RELEASE ORAL 2 TIMES DAILY
Qty: 360 TAB | Refills: 3 | Status: SHIPPED | OUTPATIENT
Start: 2020-08-27 | End: 2020-08-31 | Stop reason: SDUPTHER

## 2020-08-31 RX ORDER — POTASSIUM CHLORIDE 750 MG/1
20 TABLET, EXTENDED RELEASE ORAL 2 TIMES DAILY
Qty: 360 TAB | Refills: 3 | Status: SHIPPED | OUTPATIENT
Start: 2020-08-31 | End: 2021-02-16 | Stop reason: SDUPTHER

## 2021-02-16 ENCOUNTER — TELEPHONE (OUTPATIENT)
Dept: FAMILY MEDICINE CLINIC | Age: 46
End: 2021-02-16

## 2021-02-16 ENCOUNTER — OFFICE VISIT (OUTPATIENT)
Dept: FAMILY MEDICINE CLINIC | Age: 46
End: 2021-02-16
Payer: COMMERCIAL

## 2021-02-16 VITALS
HEIGHT: 62 IN | DIASTOLIC BLOOD PRESSURE: 82 MMHG | HEART RATE: 76 BPM | OXYGEN SATURATION: 98 % | BODY MASS INDEX: 29.63 KG/M2 | RESPIRATION RATE: 16 BRPM | SYSTOLIC BLOOD PRESSURE: 130 MMHG | WEIGHT: 161 LBS | TEMPERATURE: 97.5 F

## 2021-02-16 DIAGNOSIS — Z51.81 ENCOUNTER FOR MEDICATION MONITORING: ICD-10-CM

## 2021-02-16 DIAGNOSIS — F51.01 PRIMARY INSOMNIA: ICD-10-CM

## 2021-02-16 DIAGNOSIS — I10 ESSENTIAL HYPERTENSION, BENIGN: Primary | ICD-10-CM

## 2021-02-16 DIAGNOSIS — F43.23 ADJUSTMENT DISORDER WITH MIXED ANXIETY AND DEPRESSED MOOD: ICD-10-CM

## 2021-02-16 PROCEDURE — 99213 OFFICE O/P EST LOW 20 MIN: CPT | Performed by: FAMILY MEDICINE

## 2021-02-16 RX ORDER — AMLODIPINE BESYLATE 5 MG/1
TABLET ORAL
Qty: 90 TAB | Refills: 3 | Status: SHIPPED | OUTPATIENT
Start: 2021-02-16 | End: 2021-10-06 | Stop reason: SDUPTHER

## 2021-02-16 RX ORDER — POTASSIUM CHLORIDE 750 MG/1
20 TABLET, EXTENDED RELEASE ORAL DAILY
Qty: 360 TAB | Refills: 3 | COMMUNITY
Start: 2021-02-16 | End: 2021-02-23 | Stop reason: DRUGHIGH

## 2021-02-16 NOTE — PROGRESS NOTES
Chief Complaint   Patient presents with    Hypertension     follow up         Mammogram 7/1/2020    1. Have you been to the ER, urgent care clinic since your last visit? Hospitalized since your last visit? No    2. Have you seen or consulted any other health care providers outside of the [address] [place] [address] since your last visit? Include any pap smears or colon screening.  No

## 2021-02-16 NOTE — PROGRESS NOTES
HISTORY OF PRESENT ILLNESS  Hafsa Hdz is a 41 y.o. female. HPI   Follow up on BP. Overall doing well. 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 Doing the precautionary measures at home to reduce risks of exposure COVID19. Also wearing mask when she is going out. No known sick contacts or known exposure to [address]. She has been feeling more \"lonely\" since being isolated with the pandemic. Has her \"moments\" of feeling depressed and other times feeling anxious. Also has trouble with sleeping on some nights. No SI/HI. Lives alone. She is also in school taking class towards a health administration degree. Cardiovascular Review:  The patient has hypertension. Diet and Lifestyle: generally follows a low fat low cholesterol diet, generally follows a low sodium diet, exercises sporadically. Home BP Monitorins/70-80s. Pertinent ROS: taking medications as instructed, no medication side effects noted, no TIA's, no chest pain on exertion, no dyspnea on exertion, no swelling of ankles, no palpitations. Patient Active Problem List   Diagnosis Code    Glaucoma H40.9    Essential hypertension, benign I10    Encounter for medication monitoring Z51.81       Current Outpatient Medications   Medication Sig Dispense Refill    potassium chloride (KLOR-CON) 10 mEq tablet Take 2 Tabs by mouth two (2) times a day. 360 Tab 3    timolol (TIMOPTIC-XE) 0.5 % ophthalmic gel-forming INSTILL 1 DROP INTO BOTH EYES EVERY MORNING      Mibelas 24 Fe 1 mg-20 mcg(24) /75 mg (4) chew TAKE 1 TABLET BY MOUTH EVERY DAY      hydroCHLOROthiazide (HYDRODIURIL) 25 mg tablet Take 1 Tab by mouth daily. 90 Tab 3    amLODIPine (NORVASC) 5 mg tablet TAKE 1 TABLET BY MOUTH EVERY DAY 90 Tab 3       Allergies   Allergen Reactions    Sulfa (Sulfonamide Antibiotics) Hives       No past medical history on file.       Past Surgical History:   Procedure Laterality Date    HX MYOMECTOMY  2016    Dr. Chidi Echevarria         Family History   Problem Relation Age of Onset    Hypertension Mother     Lupus Mother     Heart Disease Mother     Cancer Father         lung cancer    Diabetes Sister     Hypertension Sister     Hypertension Brother     Kidney Disease Brother     Hypertension Sister        Social History     Tobacco Use    Smoking status: Never Smoker    Smokeless tobacco: Never Used   Substance Use Topics    Alcohol use: Yes     Alcohol/week: 0.0 standard drinks     Comment: occasionally     Lab Results   Component Value Date/Time    WBC 5.3 01/08/2020 03:59 PM    HGB 14.1 01/08/2020 03:59 PM    HCT 40.8 01/08/2020 03:59 PM    PLATELET 720 41/85/2884 03:59 PM    MCV 93 01/08/2020 03:59 PM     Lab Results   Component Value Date/Time    Cholesterol, total 196 01/08/2020 03:59 PM    HDL Cholesterol 70 01/08/2020 03:59 PM    LDL, calculated 115 (H) 01/08/2020 03:59 PM    Triglyceride 54 01/08/2020 03:59 PM     Lab Results   Component Value Date/Time    TSH 1.870 10/18/2017 09:28 AM      Lab Results   Component Value Date/Time    Sodium 138 08/25/2020 04:11 PM    Potassium 3.1 (L) 08/25/2020 04:11 PM    Chloride 96 08/25/2020 04:11 PM    CO2 28 08/25/2020 04:11 PM    Glucose 89 08/25/2020 04:11 PM    BUN 18 08/25/2020 04:11 PM    Creatinine 1.03 (H) 08/25/2020 04:11 PM    BUN/Creatinine ratio 17 08/25/2020 04:11 PM    GFR est AA 77 08/25/2020 04:11 PM    GFR est non-AA 67 08/25/2020 04:11 PM    Calcium 9.6 08/25/2020 04:11 PM    Bilirubin, total 0.4 01/08/2020 03:59 PM    ALT (SGPT) 18 01/08/2020 03:59 PM    Alk. phosphatase 65 01/08/2020 03:59 PM    Protein, total 7.6 01/08/2020 03:59 PM    Albumin 4.5 01/08/2020 03:59 PM    A-G Ratio 1.5 01/08/2020 03:59 PM      Lab Results   Component Value Date/Time    Hemoglobin A1c (POC) 5.5 01/08/2020 03:59 PM         Review of Systems   Constitutional: Negative for malaise/fatigue. HENT: Negative for congestion. Eyes: Negative for blurred vision.    Respiratory: Negative for cough and shortness of breath. Cardiovascular: Negative for chest pain, palpitations and leg swelling. Gastrointestinal: Negative for abdominal pain, constipation and heartburn. Genitourinary: Negative for dysuria, frequency and urgency. Musculoskeletal: Negative for back pain and joint pain. Neurological: Negative for dizziness, tingling and headaches. Endo/Heme/Allergies: Negative for environmental allergies. Psychiatric/Behavioral: Negative for depression. The patient does not have insomnia. Physical Exam  Vitals signs and nursing note reviewed. Constitutional:       Appearance: Normal appearance. She is well-developed. Comments: /82 (BP 1 Location: Left arm, BP Patient Position: Sitting)   Pulse 76   Temp 97.5 °F (36.4 °C) (Temporal)   Resp 16   Ht 5' 2\" (1.575 m)   Wt 161 lb (73 kg)   LMP 01/20/2021   SpO2 98%   BMI 29.45 kg/m²      HENT:      Right Ear: Tympanic membrane and ear canal normal.      Left Ear: Tympanic membrane and ear canal normal.      Nose: No mucosal edema or rhinorrhea. Neck:      Musculoskeletal: Normal range of motion and neck supple. Thyroid: No thyromegaly. Cardiovascular:      Rate and Rhythm: Normal rate and regular rhythm. Heart sounds: Normal heart sounds. No gallop. Pulmonary:      Effort: Pulmonary effort is normal.      Breath sounds: Normal breath sounds. Abdominal:      General: Bowel sounds are normal.      Palpations: Abdomen is soft. There is no mass. Tenderness: There is no abdominal tenderness. Musculoskeletal: Normal range of motion. General: No swelling. Right lower leg: No edema. Left lower leg: No edema. Lymphadenopathy:      Cervical: No cervical adenopathy. Skin:     General: Skin is warm and dry. Neurological:      General: No focal deficit present. Mental Status: She is alert and oriented to person, place, and time.    Psychiatric:         Mood and Affect: Mood normal. ASSESSMENT and PLAN  Diagnoses and all orders for this visit:    1. Essential hypertension, benign  -     LIPID PANEL  -     Refill amLODIPine (NORVASC) 5 mg tablet; TAKE 1 TABLET BY MOUTH EVERY DAY  Discussed sodium restriction, high k rich diet, maintaining ideal body weight and regular exercise program such as daily walking 30 min perday 4-5 times per week, as physiologic means to achieve blood pressure control. Medication compliance advised. 2. Adjustment disorder with mixed anxiety and depressed mood  Agrees to counseling  -     REFERRAL TO PSYCHOLOGY    3. Primary insomnia  Discussed sleep hygiene. Add melatonin as needed or tylenolol PM to help with sleep. 4. Encounter for medication monitoring  -     METABOLIC PANEL, COMPREHENSIVE  -     CBC W/O DIFF      Follow-up and Dispositions    · Return in about 6 months (around 8/16/2021). reviewed diet, exercise and weight control  cardiovascular risk and specific lipid/LDL goals reviewed  reviewed medications and side effects in detail    I have discussed diagnosis listed in this note with pt and/or family. I have discussed treatment plans and options and the risk/benefit analysis of those options, including safe use of medications and possible medication side effects. Through the use of shared decision making we have agreed to the above plan. The patient has received an after-visit summary and questions were answered concerning future plans and follow up. Advise pt of any urgent changes then to proceed to the ER.

## 2021-02-17 LAB
ALBUMIN SERPL-MCNC: 4.2 G/DL (ref 3.8–4.8)
ALBUMIN/GLOB SERPL: 1.3 {RATIO} (ref 1.2–2.2)
ALP SERPL-CCNC: 81 IU/L (ref 39–117)
ALT SERPL-CCNC: 17 IU/L (ref 0–32)
AST SERPL-CCNC: 17 IU/L (ref 0–40)
BILIRUB SERPL-MCNC: 0.3 MG/DL (ref 0–1.2)
BUN SERPL-MCNC: 10 MG/DL (ref 6–24)
BUN/CREAT SERPL: 11 (ref 9–23)
CALCIUM SERPL-MCNC: 9.7 MG/DL (ref 8.7–10.2)
CHLORIDE SERPL-SCNC: 94 MMOL/L (ref 96–106)
CHOLEST SERPL-MCNC: 167 MG/DL (ref 100–199)
CO2 SERPL-SCNC: 28 MMOL/L (ref 20–29)
CREAT SERPL-MCNC: 0.88 MG/DL (ref 0.57–1)
ERYTHROCYTE [DISTWIDTH] IN BLOOD BY AUTOMATED COUNT: 12.7 % (ref 11.7–15.4)
GLOBULIN SER CALC-MCNC: 3.2 G/DL (ref 1.5–4.5)
GLUCOSE SERPL-MCNC: 84 MG/DL (ref 65–99)
HCT VFR BLD AUTO: 43.5 % (ref 34–46.6)
HDLC SERPL-MCNC: 55 MG/DL
HGB BLD-MCNC: 14.6 G/DL (ref 11.1–15.9)
INTERPRETATION, 910389: NORMAL
LDLC SERPL CALC-MCNC: 98 MG/DL (ref 0–99)
MCH RBC QN AUTO: 31 PG (ref 26.6–33)
MCHC RBC AUTO-ENTMCNC: 33.6 G/DL (ref 31.5–35.7)
MCV RBC AUTO: 92 FL (ref 79–97)
PLATELET # BLD AUTO: 217 X10E3/UL (ref 150–450)
POTASSIUM SERPL-SCNC: 3.1 MMOL/L (ref 3.5–5.2)
PROT SERPL-MCNC: 7.4 G/DL (ref 6–8.5)
RBC # BLD AUTO: 4.71 X10E6/UL (ref 3.77–5.28)
SODIUM SERPL-SCNC: 139 MMOL/L (ref 134–144)
TRIGL SERPL-MCNC: 71 MG/DL (ref 0–149)
VLDLC SERPL CALC-MCNC: 14 MG/DL (ref 5–40)
WBC # BLD AUTO: 4.8 X10E3/UL (ref 3.4–10.8)

## 2021-02-23 ENCOUNTER — TELEPHONE (OUTPATIENT)
Dept: FAMILY MEDICINE CLINIC | Age: 46
End: 2021-02-23

## 2021-02-23 RX ORDER — POTASSIUM CHLORIDE 750 MG/1
20 TABLET, EXTENDED RELEASE ORAL 2 TIMES DAILY
Qty: 360 TAB | Refills: 3 | Status: SHIPPED | OUTPATIENT
Start: 2021-02-23 | End: 2021-10-06 | Stop reason: DRUGHIGH

## 2021-02-24 ENCOUNTER — TELEPHONE (OUTPATIENT)
Dept: FAMILY MEDICINE CLINIC | Age: 46
End: 2021-02-24

## 2021-02-24 NOTE — TELEPHONE ENCOUNTER
Called patient LM, per Dr. Matias Blow, potassium was low and to increase potassium pill to take 2 pills 2 times daily. If any questions please call back.

## 2021-02-24 NOTE — PROGRESS NOTES
Please call pt and let her know that her K is low. Please increase potassium pill to start taking 2 tablets twice daily.

## 2021-02-24 NOTE — TELEPHONE ENCOUNTER
----- Message from Dave Delgado MD sent at 2/23/2021  7:13 PM EST -----  Please call pt and let her know that her K is low. Please increase potassium pill to start taking 2 tablets twice daily.

## 2021-02-25 ENCOUNTER — TELEPHONE (OUTPATIENT)
Dept: FAMILY MEDICINE CLINIC | Age: 46
End: 2021-02-25

## 2021-04-22 RX ORDER — NORETHINDRONE ACETATE AND ETHINYL ESTRADIOL AND FERROUS FUMARATE 1MG-20(24)
KIT ORAL
Qty: 3 PACKAGE | Refills: 3 | Status: SHIPPED | OUTPATIENT
Start: 2021-04-22 | End: 2022-04-20

## 2021-06-11 DIAGNOSIS — I10 ESSENTIAL HYPERTENSION, BENIGN: ICD-10-CM

## 2021-06-11 RX ORDER — HYDROCHLOROTHIAZIDE 25 MG/1
TABLET ORAL
Qty: 90 TABLET | Refills: 3 | Status: SHIPPED | OUTPATIENT
Start: 2021-06-11 | End: 2021-10-06 | Stop reason: SDUPTHER

## 2021-10-06 ENCOUNTER — OFFICE VISIT (OUTPATIENT)
Dept: FAMILY MEDICINE CLINIC | Age: 46
End: 2021-10-06
Payer: COMMERCIAL

## 2021-10-06 VITALS
DIASTOLIC BLOOD PRESSURE: 81 MMHG | OXYGEN SATURATION: 99 % | HEART RATE: 71 BPM | SYSTOLIC BLOOD PRESSURE: 124 MMHG | BODY MASS INDEX: 29.7 KG/M2 | TEMPERATURE: 98.1 F | WEIGHT: 161.4 LBS | RESPIRATION RATE: 16 BRPM | HEIGHT: 62 IN

## 2021-10-06 DIAGNOSIS — N95.1 PERIMENOPAUSE: ICD-10-CM

## 2021-10-06 DIAGNOSIS — I10 ESSENTIAL HYPERTENSION, BENIGN: Primary | ICD-10-CM

## 2021-10-06 DIAGNOSIS — F43.23 ADJUSTMENT DISORDER WITH MIXED ANXIETY AND DEPRESSED MOOD: ICD-10-CM

## 2021-10-06 DIAGNOSIS — F51.01 PRIMARY INSOMNIA: ICD-10-CM

## 2021-10-06 DIAGNOSIS — Z51.81 ENCOUNTER FOR MEDICATION MONITORING: ICD-10-CM

## 2021-10-06 DIAGNOSIS — R60.9 FLUID RETENTION: ICD-10-CM

## 2021-10-06 PROCEDURE — 99213 OFFICE O/P EST LOW 20 MIN: CPT | Performed by: FAMILY MEDICINE

## 2021-10-06 RX ORDER — AMLODIPINE BESYLATE 5 MG/1
10 TABLET ORAL DAILY
Qty: 180 TABLET | Refills: 3 | Status: SHIPPED | OUTPATIENT
Start: 2021-10-06 | End: 2022-04-11

## 2021-10-06 RX ORDER — POTASSIUM CHLORIDE 750 MG/1
10 TABLET, EXTENDED RELEASE ORAL 2 TIMES DAILY
COMMUNITY
End: 2021-10-06 | Stop reason: SINTOL

## 2021-10-06 RX ORDER — HYDROCHLOROTHIAZIDE 25 MG/1
25 TABLET ORAL
Qty: 90 TABLET | Refills: 3
Start: 2021-10-06 | End: 2022-06-21

## 2021-10-06 RX ORDER — AMLODIPINE BESYLATE 5 MG/1
10 TABLET ORAL DAILY
Qty: 180 TABLET | Refills: 3 | Status: SHIPPED | OUTPATIENT
Start: 2021-10-06 | End: 2021-10-06 | Stop reason: SDUPTHER

## 2021-10-06 NOTE — PROGRESS NOTES
HISTORY OF PRESENT ILLNESS  Keeley Luong is a 41 y.o. female. HPI   Follow up on BP. Overall doing well. She works at [employer]. Doing the precautionary measures at home to reduce risks of exposure COVID19. Also wearing mask when she is going out. No known sick contacts or known exposure to [address]. C/o hot flashes over the past several months. Seems worse at night but not every night. Also more frequent around the time of her cycle. She has appt with her GYN for her routine visit and will discussed further at that appt. She is on OCPs. Recommended adding black cohosh to help with hot flashes until her appt. Still has mood swings and feeling irritable and some feelings of depressed mood also. No SI/HI. She has been [facility]. Has some trouble sleeping at night also but thinks this is more related to the hot flashes and \"going thru menopause\". Cardiovascular Review:  The patient has hypertension. She does not like taking the potassium and her K level has been low the last 2 times it was check. We discussed switching her off of the HCTZ to alleviate the need for the potassium supplement. Diet and Lifestyle: generally follows a low fat low cholesterol diet, generally follows a low sodium diet, exercises sporadically. Home BP Monitorins/70-80s. Pertinent ROS: taking medications as instructed, no medication side effects noted, no TIA's, no chest pain on exertion, no dyspnea on exertion, no swelling of ankles, no palpitations. Patient Active Problem List   Diagnosis Code    Glaucoma H40.9    Essential hypertension, benign I10    Encounter for medication monitoring Z51.81       Current Outpatient Medications   Medication Sig Dispense Refill    potassium chloride (Klor-Con M10) 10 mEq tablet Take 10 mEq by mouth two (2) times a day.       hydroCHLOROthiazide (HYDRODIURIL) 25 mg tablet TAKE 1 TABLET BY MOUTH EVERY DAY 90 Tablet 3    Karla 24 Fe 1 mg-20 mcg(24) /75 mg (4) chew TAKE 1 TABLET BY MOUTH EVERY DAY 3 Package 3    amLODIPine (NORVASC) 5 mg tablet TAKE 1 TABLET BY MOUTH EVERY DAY 90 Tab 3    timolol (TIMOPTIC-XE) 0.5 % ophthalmic gel-forming INSTILL 1 DROP INTO BOTH EYES EVERY MORNING      potassium chloride (KLOR-CON) 10 mEq tablet Take 2 Tabs by mouth two (2) times a day. (Patient not taking: Reported on 10/6/2021) 360 Tab 3       Allergies   Allergen Reactions    Sulfa (Sulfonamide Antibiotics) Hives       History reviewed. No pertinent past medical history.     Past Surgical History:   Procedure Laterality Date    HX MYOMECTOMY  12/21/2016    Dr. Mayra Rodriguez       Family History   Problem Relation Age of Onset   [facility] Hypertension Mother     Lupus Mother     Heart Disease Mother     Cancer Father         lung cancer    Diabetes Sister     Hypertension Sister     Hypertension Brother     Kidney Disease Brother     Hypertension Sister        Social History     Tobacco Use    Smoking status: Never Smoker    Smokeless tobacco: Never Used   Substance Use Topics    Alcohol use: Yes     Comment: occasional        Lab Results   Component Value Date/Time    WBC 4.8 02/16/2021 11:48 AM    HGB 14.6 02/16/2021 11:48 AM    HCT 43.5 02/16/2021 11:48 AM    PLATELET 444 39/24/2879 11:48 AM    MCV 92 02/16/2021 11:48 AM     Lab Results   Component Value Date/Time    Cholesterol, total 167 02/16/2021 11:48 AM    HDL Cholesterol 55 02/16/2021 11:48 AM    LDL, calculated 98 02/16/2021 11:48 AM    LDL, calculated 115 (H) 01/08/2020 03:59 PM    Triglyceride 71 02/16/2021 11:48 AM     Lab Results   Component Value Date/Time    TSH 1.870 10/18/2017 09:28 AM      Lab Results   Component Value Date/Time    Sodium 139 02/16/2021 11:48 AM    Potassium 3.1 (L) 02/16/2021 11:48 AM    Chloride 94 (L) 02/16/2021 11:48 AM    CO2 28 02/16/2021 11:48 AM    Glucose 84 02/16/2021 11:48 AM    BUN 10 02/16/2021 11:48 AM    Creatinine 0.88 02/16/2021 11:48 AM BUN/Creatinine ratio 11 02/16/2021 11:48 AM    GFR est AA 92 02/16/2021 11:48 AM    GFR est non-AA 80 02/16/2021 11:48 AM    Calcium 9.7 02/16/2021 11:48 AM    Bilirubin, total 0.3 02/16/2021 11:48 AM    ALT (SGPT) 17 02/16/2021 11:48 AM    Alk. phosphatase 81 02/16/2021 11:48 AM    Protein, total 7.4 02/16/2021 11:48 AM    Albumin 4.2 02/16/2021 11:48 AM    A-G Ratio 1.3 02/16/2021 11:48 AM      Lab Results   Component Value Date/Time    Hemoglobin A1c (POC) 5.5 01/08/2020 03:59 PM         Review of Systems   Constitutional: Negative for malaise/fatigue. HENT: Negative for congestion. Eyes: Negative for blurred vision. Respiratory: Negative for cough and shortness of breath. Cardiovascular: Negative for chest pain, palpitations and leg swelling. Gastrointestinal: Negative for abdominal pain, constipation and heartburn. Genitourinary: Negative for dysuria, frequency and urgency. Musculoskeletal: Negative for back pain and joint pain. Neurological: Negative for dizziness, tingling and headaches. Endo/Heme/Allergies: Negative for environmental allergies. Psychiatric/Behavioral: Negative for depression. Physical Exam  Vitals and nursing note reviewed. Constitutional:       Appearance: Normal appearance. She is well-developed. Comments: /81 (BP 1 Location: Left arm, BP Patient Position: Sitting)   Pulse 71   Temp 98.1 °F (36.7 °C) (Oral)   Resp 16   Ht 5' 2\" (1.575 m)   Wt 161 lb 6.4 oz (73.2 kg)   LMP 10/02/2021   SpO2 99%   BMI 29.52 kg/m²      HENT:      Right Ear: Tympanic membrane and ear canal normal.      Left Ear: Tympanic membrane and ear canal normal.      Nose: No mucosal edema. Neck:      Thyroid: No thyromegaly. Cardiovascular:      Rate and Rhythm: Normal rate and regular rhythm. Heart sounds: Normal heart sounds. No gallop. Pulmonary:      Effort: Pulmonary effort is normal.      Breath sounds: Normal breath sounds.    Abdominal:      General: Bowel sounds are normal.      Palpations: Abdomen is soft. There is no mass. Tenderness: There is no abdominal tenderness. Musculoskeletal:         General: No swelling. Normal range of motion. Cervical back: Normal range of motion and neck supple. Right lower leg: No edema. Left lower leg: No edema. Lymphadenopathy:      Cervical: No cervical adenopathy. Skin:     General: Skin is warm and dry. Neurological:      General: No focal deficit present. Mental Status: She is alert and oriented to person, place, and time. Psychiatric:         Mood and Affect: Mood normal. Affect is not flat. Speech: Speech normal.         Behavior: Behavior normal.         ASSESSMENT and PLAN  Diagnoses and all orders for this visit:    1. Essential hypertension, benign  Will stop HCTZ daily.    -     Increase amLODIPine (NORVASC) 5 mg tablet; Take 2 Tablets by mouth daily. 2. Adjustment disorder with mixed anxiety and depressed mood//  3. Primary insomnia  Continue with counseling. 4. Encounter for medication monitoring    5. Perimenopause//  6. Fluid retention  Follow up with her GYN  -     Change hydroCHLOROthiazide (HYDRODIURIL) 25 mg tablet; Take 1 Tablet by mouth daily as needed (menstrual fluid retention). Follow-up and Dispositions    · Return in about 6 weeks (around 11/17/2021). reviewed diet, exercise and weight control  cardiovascular risk and specific lipid/LDL goals reviewed  reviewed medications and side effects in detail    I have discussed diagnosis listed in this note with pt and/or family. I have discussed treatment plans and options and the risk/benefit analysis of those options, including safe use of medications and possible medication side effects. Through the use of shared decision making we have agreed to the above plan. The patient has received an after-visit summary and questions were answered concerning future plans and follow up.   Advise pt of any urgent changes then to proceed to the ER.

## 2021-10-06 NOTE — PROGRESS NOTES
Chief Complaint   Patient presents with    Hypertension     follow up           Mammogram 8/2/2021    1. Have you been to the ER, urgent care clinic since your last visit? Hospitalized since your last visit? No    2. Have you seen or consulted any other health care providers outside of the [address] [place] [address] since your last visit? Include any pap smears or colon screening.  No

## 2021-11-29 ENCOUNTER — OFFICE VISIT (OUTPATIENT)
Dept: FAMILY MEDICINE CLINIC | Age: 46
End: 2021-11-29
Payer: COMMERCIAL

## 2021-11-29 VITALS
BODY MASS INDEX: 29.4 KG/M2 | DIASTOLIC BLOOD PRESSURE: 76 MMHG | WEIGHT: 159.8 LBS | OXYGEN SATURATION: 98 % | HEART RATE: 88 BPM | RESPIRATION RATE: 16 BRPM | TEMPERATURE: 98.3 F | HEIGHT: 62 IN | SYSTOLIC BLOOD PRESSURE: 121 MMHG

## 2021-11-29 DIAGNOSIS — F51.01 PRIMARY INSOMNIA: ICD-10-CM

## 2021-11-29 DIAGNOSIS — I10 ESSENTIAL HYPERTENSION, BENIGN: Primary | ICD-10-CM

## 2021-11-29 DIAGNOSIS — F43.23 ADJUSTMENT DISORDER WITH MIXED ANXIETY AND DEPRESSED MOOD: ICD-10-CM

## 2021-11-29 DIAGNOSIS — Z51.81 ENCOUNTER FOR MEDICATION MONITORING: ICD-10-CM

## 2021-11-29 PROCEDURE — 99213 OFFICE O/P EST LOW 20 MIN: CPT | Performed by: FAMILY MEDICINE

## 2021-11-29 NOTE — PROGRESS NOTES
HISTORY OF PRESENT ILLNESS  Keeley Luong is a 40 y.o. female. Follow up on BP. Overall doing well. 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 Doing the precautionary measures at home to reduce risks of exposure COVID19. Also wearing mask when she is going out. No known sick contacts or known exposure to [place]. Still has mood swings and feeling irritable and some feelings of depressed mood also. No SI/HI. She has been seeing Delilah. Has some trouble sleeping at night also but thinks this is more related to the hot flashes and \"going thru menopause\". Cardiovascular Review:  The patient has hypertension. She does not like taking the potassium and her K level has been low the last 2 times it was check. She is off of the HCTZ to alleviate the need for the potassium supplement. Diet and Lifestyle: generally follows a low fat low cholesterol diet, generally follows a low sodium diet, exercises sporadically. Home BP Monitorins/70-80s. Pertinent ROS: taking medications as instructed, no medication side effects noted, no TIA's, no chest pain on exertion, no dyspnea on exertion, no swelling of ankles, no palpitations. Patient Active Problem List   Diagnosis Code    Glaucoma H40.9    Essential hypertension, benign I10    Encounter for medication monitoring Z51.81       Current Outpatient Medications   Medication Sig Dispense Refill    hydroCHLOROthiazide (HYDRODIURIL) 25 mg tablet Take 1 Tablet by mouth daily as needed (menstrual fluid retention). 90 Tablet 3    amLODIPine (NORVASC) 5 mg tablet Take 2 Tablets by mouth daily. 180 Tablet 3    Karla 24 Fe 1 mg-20 mcg(24) /75 mg (4) chew TAKE 1 TABLET BY MOUTH EVERY DAY 3 Package 3    timolol (TIMOPTIC-XE) 0.5 % ophthalmic gel-forming INSTILL 1 DROP INTO BOTH EYES EVERY MORNING         Allergies   Allergen Reactions    Sulfa (Sulfonamide Antibiotics) Hives       History reviewed. No pertinent past medical history.       Past Surgical History:   Procedure Laterality Date    HX MYOMECTOMY  12/21/2016    Dr. Janie Olivas         Family History   Problem Relation Age of Onset   Judie Florencio Hypertension Mother     Lupus Mother     Heart Disease Mother     Cancer Father         lung cancer    Diabetes Sister     Hypertension Sister     Hypertension Brother     Kidney Disease Brother     Hypertension Sister        Social History     Tobacco Use    Smoking status: Never Smoker    Smokeless tobacco: Never Used   Substance Use Topics    Alcohol use: Yes     Comment: occasional        Lab Results   Component Value Date/Time    WBC 4.8 02/16/2021 11:48 AM    HGB 14.6 02/16/2021 11:48 AM    HCT 43.5 02/16/2021 11:48 AM    PLATELET 402 92/10/5926 11:48 AM    MCV 92 02/16/2021 11:48 AM     Lab Results   Component Value Date/Time    Cholesterol, total 167 02/16/2021 11:48 AM    HDL Cholesterol 55 02/16/2021 11:48 AM    LDL, calculated 98 02/16/2021 11:48 AM    LDL, calculated 115 (H) 01/08/2020 03:59 PM    Triglyceride 71 02/16/2021 11:48 AM     Lab Results   Component Value Date/Time    TSH 1.870 10/18/2017 09:28 AM      Lab Results   Component Value Date/Time    Sodium 139 02/16/2021 11:48 AM    Potassium 3.1 (L) 02/16/2021 11:48 AM    Chloride 94 (L) 02/16/2021 11:48 AM    CO2 28 02/16/2021 11:48 AM    Glucose 84 02/16/2021 11:48 AM    BUN 10 02/16/2021 11:48 AM    Creatinine 0.88 02/16/2021 11:48 AM    BUN/Creatinine ratio 11 02/16/2021 11:48 AM    GFR est AA 92 02/16/2021 11:48 AM    GFR est non-AA 80 02/16/2021 11:48 AM    Calcium 9.7 02/16/2021 11:48 AM    Bilirubin, total 0.3 02/16/2021 11:48 AM    ALT (SGPT) 17 02/16/2021 11:48 AM    Alk.  phosphatase 81 02/16/2021 11:48 AM    Protein, total 7.4 02/16/2021 11:48 AM    Albumin 4.2 02/16/2021 11:48 AM    A-G Ratio 1.3 02/16/2021 11:48 AM      Lab Results   Component Value Date/Time    Hemoglobin A1c (POC) 5.5 01/08/2020 03:59 PM         Review of Systems   Constitutional: Negative for malaise/fatigue. HENT: Negative for congestion. Eyes: Negative for blurred vision. Respiratory: Negative for cough and shortness of breath. Cardiovascular: Negative for chest pain, palpitations and leg swelling. Gastrointestinal: Negative for abdominal pain, constipation and heartburn. Genitourinary: Negative for dysuria, frequency and urgency. Musculoskeletal: Negative for back pain and joint pain. Neurological: Negative for dizziness, tingling and headaches. Endo/Heme/Allergies: Negative for environmental allergies. Psychiatric/Behavioral: Negative for depression. Physical Exam  Vitals and nursing note reviewed. Constitutional:       Appearance: Normal appearance. She is well-developed. Comments: /76 (BP 1 Location: Left arm, BP Patient Position: Sitting)   Pulse 88   Temp 98.3 °F (36.8 °C) (Oral)   Resp 16   Ht 5' 2\" (1.575 m)   Wt 159 lb 12.8 oz (72.5 kg)   LMP 11/27/2021   SpO2 98%   BMI 29.23 kg/m²      Neck:      Thyroid: No thyromegaly. Cardiovascular:      Rate and Rhythm: Normal rate and regular rhythm. Heart sounds: Normal heart sounds. No gallop. Pulmonary:      Effort: Pulmonary effort is normal.      Breath sounds: Normal breath sounds. Musculoskeletal:      Right lower leg: No edema. Left lower leg: No edema. Neurological:      Mental Status: She is alert. ASSESSMENT and PLAN  Diagnoses and all orders for this visit:    1. Essential hypertension, benign  Stable on current regimen  Discussed sodium restriction, high k rich diet, maintaining ideal body weight and regular exercise program such as daily walking 30 min perday 4-5 times per week, as physiologic means to achieve blood pressure control. Medication compliance advised. 2. Adjustment disorder with mixed anxiety and depressed mood  Continue with counseling. We discussed medication but she does not thinks that she need to take anything for now.       3. Primary insomnia  Retry melatonin. Discussed sleep hygiene. 4. Encounter for medication monitoring  -     METABOLIC PANEL, BASIC; Future  -     MAGNESIUM; Future      Follow-up and Dispositions    · Return in about 6 months (around 5/29/2022). current treatment plan is effective, no change in therapy  reviewed diet, exercise and weight control  cardiovascular risk and specific lipid/LDL goals reviewed  reviewed medications and side effects in detail    I have discussed diagnosis listed in this note with pt and/or family. I have discussed treatment plans and options and the risk/benefit analysis of those options, including safe use of medications and possible medication side effects. Through the use of shared decision making we have agreed to the above plan. The patient has received an after-visit summary and questions were answered concerning future plans and follow up. Advise pt of any urgent changes then to proceed to the ER.

## 2021-11-29 NOTE — PROGRESS NOTES
Chief Complaint   Patient presents with    Hypertension     follow up           Mammogram 8/2/2021    1. Have you been to the ER, urgent care clinic since your last visit? Hospitalized since your last visit? No    2. Have you seen or consulted any other health care providers outside of the [address] [place] [address] since your last visit? Include any pap smears or colon screening.  No

## 2021-11-30 LAB
BUN SERPL-MCNC: 11 MG/DL (ref 6–24)
BUN/CREAT SERPL: 9 (ref 9–23)
CALCIUM SERPL-MCNC: 9.4 MG/DL (ref 8.7–10.2)
CHLORIDE SERPL-SCNC: 97 MMOL/L (ref 96–106)
CO2 SERPL-SCNC: 27 MMOL/L (ref 20–29)
CREAT SERPL-MCNC: 1.19 MG/DL (ref 0.57–1)
GLUCOSE SERPL-MCNC: 112 MG/DL (ref 65–99)
INTERPRETATION: NORMAL
MAGNESIUM SERPL-MCNC: 2.3 MG/DL (ref 1.6–2.3)
POTASSIUM SERPL-SCNC: 3 MMOL/L (ref 3.5–5.2)
SODIUM SERPL-SCNC: 139 MMOL/L (ref 134–144)

## 2021-12-09 ENCOUNTER — TELEPHONE (OUTPATIENT)
Dept: FAMILY MEDICINE CLINIC | Age: 46
End: 2021-12-09

## 2021-12-09 RX ORDER — POTASSIUM CHLORIDE 20 MEQ/1
20 TABLET, EXTENDED RELEASE ORAL 2 TIMES DAILY
Qty: 14 TABLET | Refills: 0 | Status: SHIPPED | OUTPATIENT
Start: 2021-12-09 | End: 2021-12-16

## 2021-12-09 NOTE — TELEPHONE ENCOUNTER
LM.  K 3.0. Sending rx to the pharmacy to  take kcl 20 idalmis bid for 1 week. Now only taking the HCT prn. Should help to keep the K levevl in the normal range.   Repeat lab on 12/28 at 8am.

## 2021-12-30 ENCOUNTER — CLINICAL SUPPORT (OUTPATIENT)
Dept: FAMILY MEDICINE CLINIC | Age: 46
End: 2021-12-30

## 2021-12-30 DIAGNOSIS — E87.6 HYPOKALEMIA: Primary | ICD-10-CM

## 2021-12-31 LAB — POTASSIUM SERPL-SCNC: 3.5 MMOL/L (ref 3.5–5.2)

## 2022-01-04 ENCOUNTER — TELEPHONE (OUTPATIENT)
Dept: FAMILY MEDICINE CLINIC | Age: 47
End: 2022-01-04

## 2022-01-31 RX ORDER — POTASSIUM CHLORIDE 1500 MG/1
TABLET, EXTENDED RELEASE ORAL
Qty: 14 TABLET | Refills: 0 | Status: SHIPPED | OUTPATIENT
Start: 2022-01-31 | End: 2022-06-10 | Stop reason: ALTCHOICE

## 2022-01-31 RX ORDER — POTASSIUM CHLORIDE 20 MEQ/1
TABLET, EXTENDED RELEASE ORAL
Qty: 14 TABLET | Refills: 0 | OUTPATIENT
Start: 2022-01-31

## 2022-03-19 PROBLEM — Z51.81 ENCOUNTER FOR MEDICATION MONITORING: Status: ACTIVE | Noted: 2017-04-26

## 2022-04-09 DIAGNOSIS — I10 ESSENTIAL HYPERTENSION, BENIGN: ICD-10-CM

## 2022-04-11 RX ORDER — AMLODIPINE BESYLATE 5 MG/1
TABLET ORAL
Qty: 90 TABLET | Refills: 3 | Status: SHIPPED | OUTPATIENT
Start: 2022-04-11

## 2022-04-20 RX ORDER — NORETHINDRONE ACETATE AND ETHINYL ESTRADIOL AND FERROUS FUMARATE 1MG-20(24)
KIT ORAL
Qty: 3 DOSE PACK | Refills: 3 | Status: SHIPPED | OUTPATIENT
Start: 2022-04-20

## 2022-06-10 ENCOUNTER — OFFICE VISIT (OUTPATIENT)
Dept: FAMILY MEDICINE CLINIC | Age: 47
End: 2022-06-10
Payer: COMMERCIAL

## 2022-06-10 VITALS
TEMPERATURE: 98.1 F | HEIGHT: 62 IN | HEART RATE: 83 BPM | SYSTOLIC BLOOD PRESSURE: 130 MMHG | BODY MASS INDEX: 30.22 KG/M2 | OXYGEN SATURATION: 96 % | RESPIRATION RATE: 12 BRPM | DIASTOLIC BLOOD PRESSURE: 79 MMHG | WEIGHT: 164.2 LBS

## 2022-06-10 DIAGNOSIS — I10 ESSENTIAL HYPERTENSION, BENIGN: Primary | ICD-10-CM

## 2022-06-10 DIAGNOSIS — Z51.81 ENCOUNTER FOR MEDICATION MONITORING: ICD-10-CM

## 2022-06-10 PROCEDURE — 99212 OFFICE O/P EST SF 10 MIN: CPT | Performed by: FAMILY MEDICINE

## 2022-06-10 NOTE — PROGRESS NOTES
HISTORY OF PRESENT ILLNESS  Yossi Kyle is a 40 y.o. female. HPI   Follow up on BP. Overall doing well. 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 Doing the precautionary measures at home to reduce risks of exposure COVID19. Also wearing mask when she is going out. No known sick contacts or known exposure to [place]. Cardiovascular Review:  The patient has hypertension. She does not like taking the potassium and her K level has been low the last 2 times it was check. She is off of the HCTZ to alleviate the need for the potassium supplement. Diet and Lifestyle: generally follows a low fat low cholesterol diet, generally follows a low sodium diet, exercises sporadically. Home BP Monitorins/70-80s. Pertinent ROS: taking medications as instructed, no medication side effects noted, no TIA's, no chest pain on exertion, no dyspnea on exertion, no swelling of ankles, no palpitations. Patient Active Problem List   Diagnosis Code    Glaucoma H40.9    Essential hypertension, benign I10    Encounter for medication monitoring Z51.81       Current Outpatient Medications   Medication Sig Dispense Refill    Karla 24 Fe 1 mg-20 mcg(24) /75 mg (4) chew TAKE 1 TABLET BY MOUTH EVERY DAY 3 Dose Pack 3    amLODIPine (NORVASC) 5 mg tablet TAKE 1 TABLET BY MOUTH EVERY DAY 90 Tablet 3    Klor-Con M20 20 mEq tablet TAKE 1 TABLET BY MOUTH TWICE A DAY FOR 7 DAYS 14 Tablet 0    hydroCHLOROthiazide (HYDRODIURIL) 25 mg tablet Take 1 Tablet by mouth daily as needed (menstrual fluid retention). 90 Tablet 3    timolol (TIMOPTIC-XE) 0.5 % ophthalmic gel-forming INSTILL 1 DROP INTO BOTH EYES EVERY MORNING         Allergies   Allergen Reactions    Sulfa (Sulfonamide Antibiotics) Hives       No past medical history on file.     Past Surgical History:   Procedure Laterality Date    HX MYOMECTOMY  2016    Dr. Keith Roman       Family History   Problem Relation Age of Onset    Hypertension Mother    Anel Pro Lupus Mother     Heart Disease Mother     Cancer Father         lung cancer    Diabetes Sister     Hypertension Sister     Hypertension Brother     Kidney Disease Brother     Hypertension Sister        Social History     Tobacco Use    Smoking status: Never Smoker    Smokeless tobacco: Never Used   Substance Use Topics    Alcohol use: Yes     Comment: occasional        Lab Results   Component Value Date/Time    WBC 4.8 02/16/2021 11:48 AM    HGB 14.6 02/16/2021 11:48 AM    HCT 43.5 02/16/2021 11:48 AM    PLATELET 684 65/11/0301 11:48 AM    MCV 92 02/16/2021 11:48 AM     Lab Results   Component Value Date/Time    Cholesterol, total 167 02/16/2021 11:48 AM    HDL Cholesterol 55 02/16/2021 11:48 AM    LDL, calculated 98 02/16/2021 11:48 AM    LDL, calculated 115 (H) 01/08/2020 03:59 PM    Triglyceride 71 02/16/2021 11:48 AM     Lab Results   Component Value Date/Time    TSH 1.870 10/18/2017 09:28 AM      Lab Results   Component Value Date/Time    Sodium 139 11/29/2021 12:00 AM    Potassium 3.5 12/30/2021 09:23 AM    Chloride 97 11/29/2021 12:00 AM    CO2 27 11/29/2021 12:00 AM    Glucose 112 (H) 11/29/2021 12:00 AM    BUN 11 11/29/2021 12:00 AM    Creatinine 1.19 (H) 11/29/2021 12:00 AM    BUN/Creatinine ratio 9 11/29/2021 12:00 AM    GFR est AA 64 11/29/2021 12:00 AM    GFR est non-AA 55 (L) 11/29/2021 12:00 AM    Calcium 9.4 11/29/2021 12:00 AM    Bilirubin, total 0.3 02/16/2021 11:48 AM    ALT (SGPT) 17 02/16/2021 11:48 AM    Alk. phosphatase 81 02/16/2021 11:48 AM    Protein, total 7.4 02/16/2021 11:48 AM    Albumin 4.2 02/16/2021 11:48 AM    A-G Ratio 1.3 02/16/2021 11:48 AM      Lab Results   Component Value Date/Time    Hemoglobin A1c (POC) 5.5 01/08/2020 03:59 PM         Review of Systems   Constitutional: Negative for malaise/fatigue. HENT: Negative for congestion. Eyes: Negative for blurred vision. Respiratory: Negative for cough and shortness of breath.     Cardiovascular: Negative for chest pain, palpitations and leg swelling. Gastrointestinal: Negative for abdominal pain, constipation and heartburn. Genitourinary: Negative for dysuria, frequency and urgency. Musculoskeletal: Negative for back pain and joint pain. Neurological: Negative for dizziness, tingling and headaches. Endo/Heme/Allergies: Negative for environmental allergies. Psychiatric/Behavioral: Negative for depression. The patient does not have insomnia. Physical Exam  Vitals and nursing note reviewed. Constitutional:       Appearance: Normal appearance. She is well-developed. Comments: /79 (BP 1 Location: Left arm, BP Patient Position: Sitting)   Pulse 83   Temp 98.1 °F (36.7 °C) (Oral)   Resp 12   Ht 5' 2\" (1.575 m)   Wt 164 lb 3.2 oz (74.5 kg)   LMP 06/07/2022   SpO2 96%   BMI 30.03 kg/m²    Neck:      Thyroid: No thyromegaly. Cardiovascular:      Rate and Rhythm: Normal rate and regular rhythm. Heart sounds: Normal heart sounds. No gallop. Pulmonary:      Effort: Pulmonary effort is normal.      Breath sounds: Normal breath sounds. Musculoskeletal:      Right lower leg: No edema. Left lower leg: No edema. Neurological:      Mental Status: She is alert. ASSESSMENT and PLAN  Diagnoses and all orders for this visit:    1. Essential hypertension, benign  Discussed sodium restriction, high k rich diet, maintaining ideal body weight and regular exercise program such as daily walking 30 min perday 4-5 times per week, as physiologic means to achieve blood pressure control. Medication compliance advised. -     LIPID PANEL; Future    2. Encounter for medication monitoring  -     CBC W/O DIFF; Future  -     METABOLIC PANEL, COMPREHENSIVE; Future      Follow-up and Dispositions    · Return in about 6 months (around 12/10/2022).        reviewed diet, exercise and weight control  cardiovascular risk and specific lipid/LDL goals reviewed  reviewed medications and side effects in detail    I have discussed diagnosis listed in this note with pt and/or family. I have discussed treatment plans and options and the risk/benefit analysis of those options, including safe use of medications and possible medication side effects. Through the use of shared decision making we have agreed to the above plan. The patient has received an after-visit summary and questions were answered concerning future plans and follow up. Advise pt of any urgent changes then to proceed to the ER.

## 2022-06-10 NOTE — PROGRESS NOTES
Chief Complaint   Patient presents with    Follow-up     1. Have you been to the ER, urgent care clinic since your last visit? no  Hospitalized since your last visit? no    2. Have you seen or consulted any other health care providers outside of the [address] [place] [address] since your last visit? Include any pap smears or colon screening.  no

## 2022-06-11 LAB
ALBUMIN SERPL-MCNC: 3.8 G/DL (ref 3.5–5)
ALBUMIN/GLOB SERPL: 1 {RATIO} (ref 1.1–2.2)
ALP SERPL-CCNC: 89 U/L (ref 45–117)
ALT SERPL-CCNC: 27 U/L (ref 12–78)
ANION GAP SERPL CALC-SCNC: 4 MMOL/L (ref 5–15)
AST SERPL-CCNC: 18 U/L (ref 15–37)
BILIRUB SERPL-MCNC: 0.2 MG/DL (ref 0.2–1)
BUN SERPL-MCNC: 13 MG/DL (ref 6–20)
BUN/CREAT SERPL: 13 (ref 12–20)
CALCIUM SERPL-MCNC: 9.6 MG/DL (ref 8.5–10.1)
CHLORIDE SERPL-SCNC: 99 MMOL/L (ref 97–108)
CHOLEST SERPL-MCNC: 163 MG/DL
CO2 SERPL-SCNC: 34 MMOL/L (ref 21–32)
CREAT SERPL-MCNC: 1 MG/DL (ref 0.55–1.02)
ERYTHROCYTE [DISTWIDTH] IN BLOOD BY AUTOMATED COUNT: 12.6 % (ref 11.5–14.5)
GLOBULIN SER CALC-MCNC: 4 G/DL (ref 2–4)
GLUCOSE SERPL-MCNC: 90 MG/DL (ref 65–100)
HCT VFR BLD AUTO: 43.7 % (ref 35–47)
HDLC SERPL-MCNC: 59 MG/DL
HDLC SERPL: 2.8 {RATIO} (ref 0–5)
HGB BLD-MCNC: 14.1 G/DL (ref 11.5–16)
LDLC SERPL CALC-MCNC: 92.4 MG/DL (ref 0–100)
MCH RBC QN AUTO: 31.4 PG (ref 26–34)
MCHC RBC AUTO-ENTMCNC: 32.3 G/DL (ref 30–36.5)
MCV RBC AUTO: 97.3 FL (ref 80–99)
NRBC # BLD: 0 K/UL (ref 0–0.01)
NRBC BLD-RTO: 0 PER 100 WBC
PLATELET # BLD AUTO: 181 K/UL (ref 150–400)
PMV BLD AUTO: 12.9 FL (ref 8.9–12.9)
POTASSIUM SERPL-SCNC: 3 MMOL/L (ref 3.5–5.1)
PROT SERPL-MCNC: 7.8 G/DL (ref 6.4–8.2)
RBC # BLD AUTO: 4.49 M/UL (ref 3.8–5.2)
SODIUM SERPL-SCNC: 137 MMOL/L (ref 136–145)
TRIGL SERPL-MCNC: 58 MG/DL (ref ?–150)
VLDLC SERPL CALC-MCNC: 11.6 MG/DL
WBC # BLD AUTO: 4.4 K/UL (ref 3.6–11)

## 2022-06-13 ENCOUNTER — TELEPHONE (OUTPATIENT)
Dept: FAMILY MEDICINE CLINIC | Age: 47
End: 2022-06-13

## 2022-06-13 RX ORDER — POTASSIUM CHLORIDE 20 MEQ/1
20 TABLET, EXTENDED RELEASE ORAL 2 TIMES DAILY
Qty: 60 TABLET | Refills: 5 | Status: SHIPPED | OUTPATIENT
Start: 2022-06-13

## 2022-06-13 NOTE — TELEPHONE ENCOUNTER
K is low. Resume KCl 20 meq bid for 14 days and then she will only take when taking the HCTZ with her cycles.

## 2022-06-21 DIAGNOSIS — R60.9 FLUID RETENTION: ICD-10-CM

## 2022-06-21 RX ORDER — HYDROCHLOROTHIAZIDE 25 MG/1
TABLET ORAL
Qty: 90 TABLET | Refills: 3 | Status: SHIPPED | OUTPATIENT
Start: 2022-06-21

## 2022-09-29 ENCOUNTER — TELEPHONE (OUTPATIENT)
Dept: FAMILY MEDICINE CLINIC | Age: 47
End: 2022-09-29

## 2022-09-29 NOTE — LETTER
9/29/2022     Ms. Mattie Rosen [phone]      To Whom It May Concern:    Ankit Emmanuel is currently under the care of [facility]. She is taking hydrochlorothiazide for treatmentt of her hypertension. If there are questions or concerns please have the patient contact our office.         Sincerely,      Mena Sanon MD

## 2022-09-29 NOTE — TELEPHONE ENCOUNTER
----- Message from Saranya Plunkett sent at 9/27/2022  4:18 PM EDT -----  Regarding: FW: Hydrochlorothiazide    ----- Message -----  From: Donnajean Angelucci  Sent: 9/27/2022   3:04 PM EDT  To: [facility] [place] [place] Nurse Pool  Subject: Hydrochlorothiazide                              Good afternoon Dr. Danilo Pascal,   I forgot to add in the first message. Can you state in the letter that it's for my hypertension condition please?   Chantel Kothari

## 2022-12-05 ENCOUNTER — OFFICE VISIT (OUTPATIENT)
Dept: FAMILY MEDICINE CLINIC | Age: 47
End: 2022-12-05
Payer: COMMERCIAL

## 2022-12-05 VITALS
BODY MASS INDEX: 24.48 KG/M2 | TEMPERATURE: 98.5 F | OXYGEN SATURATION: 99 % | WEIGHT: 133 LBS | RESPIRATION RATE: 12 BRPM | HEART RATE: 73 BPM | HEIGHT: 62 IN | DIASTOLIC BLOOD PRESSURE: 81 MMHG | SYSTOLIC BLOOD PRESSURE: 120 MMHG

## 2022-12-05 DIAGNOSIS — Z51.81 ENCOUNTER FOR MEDICATION MONITORING: ICD-10-CM

## 2022-12-05 DIAGNOSIS — E87.6 HYPOKALEMIA: ICD-10-CM

## 2022-12-05 DIAGNOSIS — I10 ESSENTIAL HYPERTENSION, BENIGN: Primary | ICD-10-CM

## 2022-12-05 PROCEDURE — 99212 OFFICE O/P EST SF 10 MIN: CPT | Performed by: FAMILY MEDICINE

## 2022-12-05 PROCEDURE — 3078F DIAST BP <80 MM HG: CPT | Performed by: FAMILY MEDICINE

## 2022-12-05 PROCEDURE — 3074F SYST BP LT 130 MM HG: CPT | Performed by: FAMILY MEDICINE

## 2022-12-05 RX ORDER — LATANOPROST 50 UG/ML
1 SOLUTION/ DROPS OPHTHALMIC DAILY
COMMUNITY
Start: 2022-08-29

## 2022-12-05 RX ORDER — BUTOCONAZOLE NITRATE 100 MG/5G
1 CREAM VAGINAL AS NEEDED
COMMUNITY
Start: 2022-11-01

## 2022-12-05 RX ORDER — POTASSIUM CHLORIDE 20 MEQ/1
20 TABLET, EXTENDED RELEASE ORAL DAILY
Qty: 60 TABLET | Refills: 5 | COMMUNITY
Start: 2022-12-05

## 2022-12-05 NOTE — PROGRESS NOTES
Patient identified by 2 identifiers. Chief Complaint   Patient presents with    Follow-up       1. Have you been to the ER, urgent care clinic since your last visit? Hospitalized since your last visit? Yes When: 11/2022    2. Have you seen or consulted any other health care providers outside of the [address] [place] [address] since your last visit? Include any pap smears or colon screening.  Yes [facility]: [facility]

## 2022-12-06 LAB
ANION GAP SERPL CALC-SCNC: 3 MMOL/L (ref 5–15)
BUN SERPL-MCNC: 24 MG/DL (ref 6–20)
BUN/CREAT SERPL: 24 (ref 12–20)
CALCIUM SERPL-MCNC: 9.2 MG/DL (ref 8.5–10.1)
CHLORIDE SERPL-SCNC: 104 MMOL/L (ref 97–108)
CO2 SERPL-SCNC: 32 MMOL/L (ref 21–32)
CREAT SERPL-MCNC: 1.01 MG/DL (ref 0.55–1.02)
GLUCOSE SERPL-MCNC: 99 MG/DL (ref 65–100)
POTASSIUM SERPL-SCNC: 3.9 MMOL/L (ref 3.5–5.1)
SODIUM SERPL-SCNC: 139 MMOL/L (ref 136–145)

## 2023-04-20 ENCOUNTER — TELEPHONE (OUTPATIENT)
Dept: FAMILY MEDICINE CLINIC | Age: 48
End: 2023-04-20

## 2023-04-20 NOTE — TELEPHONE ENCOUNTER
----- Message from Karen Swift sent at 4/12/2023  9:14 AM EDT -----  Regarding: FW: Hydrochlorothiazide  Contact: [phone]  Pt started the hydrochlorothiazide 25 mg on 1/28/2015 and was last refilled by you on 6/21/22. Pt stated she wanted this information in the letter. Please read below. ----- Message -----  From: Helder Alejandro  Sent: 4/11/2023   9:43 PM EDT  [place] [facility]  Subject: Hydrochlorothiazide                              Good evening Dr Maribeth Patel,     I'm competing again in another fitness competition in two weeks and I need another explanation of hydrochlorothiazide for hypertension. This is a different organization from the previous. The head coordinator wants to know how long I've been on the medication. I tried to view the history on my portal, but it only let me go but so far. Is there anyway you can provide that information in the letter please. I really appreciate it!     Forest Sifuentes

## 2023-06-05 ENCOUNTER — OFFICE VISIT (OUTPATIENT)
Age: 48
End: 2023-06-05
Payer: COMMERCIAL

## 2023-06-05 VITALS
HEART RATE: 66 BPM | DIASTOLIC BLOOD PRESSURE: 77 MMHG | TEMPERATURE: 98.2 F | RESPIRATION RATE: 14 BRPM | BODY MASS INDEX: 23.89 KG/M2 | OXYGEN SATURATION: 98 % | WEIGHT: 129.8 LBS | HEIGHT: 62 IN | SYSTOLIC BLOOD PRESSURE: 121 MMHG

## 2023-06-05 DIAGNOSIS — J02.9 SORE THROAT: ICD-10-CM

## 2023-06-05 DIAGNOSIS — J30.89 ENVIRONMENTAL AND SEASONAL ALLERGIES: ICD-10-CM

## 2023-06-05 DIAGNOSIS — Z13.220 LIPID SCREENING: ICD-10-CM

## 2023-06-05 DIAGNOSIS — Z79.899 ENCOUNTER FOR LONG-TERM (CURRENT) USE OF MEDICATIONS: ICD-10-CM

## 2023-06-05 DIAGNOSIS — Z00.00 ROUTINE GENERAL MEDICAL EXAMINATION AT A HEALTH CARE FACILITY: Primary | ICD-10-CM

## 2023-06-05 DIAGNOSIS — Z13.1 SCREENING FOR DIABETES MELLITUS: ICD-10-CM

## 2023-06-05 DIAGNOSIS — N95.1 HOT FLASHES DUE TO MENOPAUSE: ICD-10-CM

## 2023-06-05 DIAGNOSIS — I10 ESSENTIAL (PRIMARY) HYPERTENSION: ICD-10-CM

## 2023-06-05 LAB
GROUP A STREP ANTIGEN, POC: NEGATIVE
VALID INTERNAL CONTROL, POC: YES

## 2023-06-05 PROCEDURE — 99396 PREV VISIT EST AGE 40-64: CPT | Performed by: FAMILY MEDICINE

## 2023-06-05 PROCEDURE — PBSHW AMB POC RAPID STREP A: Performed by: FAMILY MEDICINE

## 2023-06-05 PROCEDURE — 87880 STREP A ASSAY W/OPTIC: CPT | Performed by: FAMILY MEDICINE

## 2023-06-05 PROCEDURE — 3074F SYST BP LT 130 MM HG: CPT | Performed by: FAMILY MEDICINE

## 2023-06-05 PROCEDURE — 3078F DIAST BP <80 MM HG: CPT | Performed by: FAMILY MEDICINE

## 2023-06-05 RX ORDER — LORATADINE 10 MG/1
10 TABLET ORAL DAILY PRN
Qty: 30 TABLET | Refills: 1
Start: 2023-06-05

## 2023-06-05 SDOH — ECONOMIC STABILITY: INCOME INSECURITY: HOW HARD IS IT FOR YOU TO PAY FOR THE VERY BASICS LIKE FOOD, HOUSING, MEDICAL CARE, AND HEATING?: NOT HARD AT ALL

## 2023-06-05 SDOH — ECONOMIC STABILITY: HOUSING INSECURITY
IN THE LAST 12 MONTHS, WAS THERE A TIME WHEN YOU DID NOT HAVE A STEADY PLACE TO SLEEP OR SLEPT IN A SHELTER (INCLUDING NOW)?: NO

## 2023-06-05 SDOH — ECONOMIC STABILITY: FOOD INSECURITY: WITHIN THE PAST 12 MONTHS, YOU WORRIED THAT YOUR FOOD WOULD RUN OUT BEFORE YOU GOT MONEY TO BUY MORE.: NEVER TRUE

## 2023-06-05 SDOH — ECONOMIC STABILITY: FOOD INSECURITY: WITHIN THE PAST 12 MONTHS, THE FOOD YOU BOUGHT JUST DIDN'T LAST AND YOU DIDN'T HAVE MONEY TO GET MORE.: NEVER TRUE

## 2023-06-05 ASSESSMENT — PATIENT HEALTH QUESTIONNAIRE - PHQ9
SUM OF ALL RESPONSES TO PHQ QUESTIONS 1-9: 0
SUM OF ALL RESPONSES TO PHQ9 QUESTIONS 1 & 2: 0
2. FEELING DOWN, DEPRESSED OR HOPELESS: 0
SUM OF ALL RESPONSES TO PHQ QUESTIONS 1-9: 0
1. LITTLE INTEREST OR PLEASURE IN DOING THINGS: 0
SUM OF ALL RESPONSES TO PHQ QUESTIONS 1-9: 0
SUM OF ALL RESPONSES TO PHQ QUESTIONS 1-9: 0

## 2023-06-05 NOTE — PROGRESS NOTES
Chief Complaint   Patient presents with    Annual Exam    Other     Itchy throat x 1 day, has not tried anything otc, no sick contacts, no covid exposure,  requesting strep test. Has history of strep       1. \"Have you been to the ER, urgent care clinic since your last visit? Hospitalized since your last visit? \" no    2. \"Have you seen or consulted any other health care providers outside of the [address] [place] [address] since your last visit? \" no     3. For patients aged 39-70: Has the patient had a colonoscopy / FIT/ Cologuard? Yes      If the patient is female:    4. For patients aged 41-77: Has the patient had a mammogram within the past 2 years? yes      5. For patients aged 21-65: Has the patient had a pap smear?  Yes- Dr Charlie Chow- 2022     Health Maintenance Due   Topic Date Due    Colorectal Cancer Screen  Never done    COVID-19 Vaccine (4 - Booster for Moderna series) 2022    Cervical cancer screen  2023      Identified pt with two pt identifiers(name and ) , Patient is accompanied by self , verbal consent received  to discuss any/all medical information
Laterality Date    MYOMECTOMY  12/21/2016    Dr. Casie Alonso     Family History   Problem Relation Age of Onset    Lupus Mother     Heart Disease Mother     Kidney Disease Brother     Diabetes Sister     Hypertension Sister     Hypertension Brother     Hypertension Sister     Hypertension Mother     Cancer Father         lung cancer     Social History     Tobacco Use    Smoking status: Never     Passive exposure: Never    Smokeless tobacco: Never   Substance Use Topics    Alcohol use: Yes        ROS:  Feeling well. No dyspnea or chest pain on exertion. No abdominal pain, change in bowel habits, black or bloody stools. No urinary tract symptoms. GYN ROS: normal menses, no abnormal bleeding, pelvic pain or discharge, no breast pain or new or enlarging lumps on self exam, she complains of hot flashes over the past few weeks. Seem worse at night which interrupts her sleeping. No neurological complaints. Objective:   /77   Pulse 66   Temp 98.2 °F (36.8 °C) (Oral)   Resp 14   Ht 5' 2\" (1.575 m)   Wt 129 lb 12.8 oz (58.9 kg)   LMP 06/04/2023 (Exact Date)   SpO2 98%   BMI 23.74 kg/m²       The patient appears well, alert, oriented x 3, in no distress. ENT normal except slight red throat. Neck supple. No adenopathy or thyromegaly. MARYSOL. Lungs are clear, good air entry, no wheezes, rhonchi or rales. S1 and S2 normal, no murmurs, regular rate and rhythm. Abdomen soft without tenderness, guarding, mass or organomegaly. Extremities show no edema, normal peripheral pulses. Neurological is normal, no focal findings. BREAST EXAM: breasts appear normal, no suspicious masses, no skin or nipple changes or axillary nodes    PELVIC EXAM: examination not indicated    Assessment/Plan:   well woman  additional lab tests per orders    EZEQUIEL and Sharon Parr was seen today for annual exam and other.     Diagnoses and all orders for this visit:    Routine general medical examination at a [facility]

## 2023-06-06 LAB
ALBUMIN SERPL-MCNC: 4.1 G/DL (ref 3.5–5)
ALBUMIN/GLOB SERPL: 1.1 (ref 1.1–2.2)
ALP SERPL-CCNC: 69 U/L (ref 45–117)
ALT SERPL-CCNC: 38 U/L (ref 12–78)
ANION GAP SERPL CALC-SCNC: 3 MMOL/L (ref 5–15)
AST SERPL-CCNC: 25 U/L (ref 15–37)
BILIRUB SERPL-MCNC: 0.4 MG/DL (ref 0.2–1)
BUN SERPL-MCNC: 24 MG/DL (ref 6–20)
BUN/CREAT SERPL: 23 (ref 12–20)
CALCIUM SERPL-MCNC: 9.7 MG/DL (ref 8.5–10.1)
CHLORIDE SERPL-SCNC: 104 MMOL/L (ref 97–108)
CHOLEST SERPL-MCNC: 164 MG/DL
CO2 SERPL-SCNC: 33 MMOL/L (ref 21–32)
CREAT SERPL-MCNC: 1.06 MG/DL (ref 0.55–1.02)
ERYTHROCYTE [DISTWIDTH] IN BLOOD BY AUTOMATED COUNT: 12.3 % (ref 11.5–14.5)
EST. AVERAGE GLUCOSE BLD GHB EST-MCNC: 91 MG/DL
GLOBULIN SER CALC-MCNC: 3.6 G/DL (ref 2–4)
GLUCOSE SERPL-MCNC: 89 MG/DL (ref 65–100)
HBA1C MFR BLD: 4.8 % (ref 4–5.6)
HCT VFR BLD AUTO: 44.6 % (ref 35–47)
HDLC SERPL-MCNC: 85 MG/DL
HDLC SERPL: 1.9 (ref 0–5)
HGB BLD-MCNC: 14.6 G/DL (ref 11.5–16)
LDLC SERPL CALC-MCNC: 75.4 MG/DL (ref 0–100)
MCH RBC QN AUTO: 32.2 PG (ref 26–34)
MCHC RBC AUTO-ENTMCNC: 32.7 G/DL (ref 30–36.5)
MCV RBC AUTO: 98.2 FL (ref 80–99)
NRBC # BLD: 0 K/UL (ref 0–0.01)
NRBC BLD-RTO: 0 PER 100 WBC
PLATELET # BLD AUTO: 178 K/UL (ref 150–400)
PMV BLD AUTO: 12.6 FL (ref 8.9–12.9)
POTASSIUM SERPL-SCNC: 3.8 MMOL/L (ref 3.5–5.1)
PROT SERPL-MCNC: 7.7 G/DL (ref 6.4–8.2)
RBC # BLD AUTO: 4.54 M/UL (ref 3.8–5.2)
SODIUM SERPL-SCNC: 140 MMOL/L (ref 136–145)
TRIGL SERPL-MCNC: 18 MG/DL
TSH SERPL DL<=0.05 MIU/L-ACNC: 2.08 UIU/ML (ref 0.36–3.74)
VLDLC SERPL CALC-MCNC: 3.6 MG/DL
WBC # BLD AUTO: 3.8 K/UL (ref 3.6–11)

## 2023-08-20 DIAGNOSIS — R60.9 EDEMA, UNSPECIFIED: ICD-10-CM

## 2023-08-21 RX ORDER — HYDROCHLOROTHIAZIDE 25 MG/1
TABLET ORAL
Qty: 90 TABLET | Refills: 3 | Status: SHIPPED | OUTPATIENT
Start: 2023-08-21

## 2023-08-21 NOTE — TELEPHONE ENCOUNTER
Last appointment: 6/5/23  Next appointment: 12/5/23  Previous refill encounter(s): 6/21/22 #90 with 3 refills    Requested Prescriptions     Pending Prescriptions Disp Refills    hydroCHLOROthiazide (HYDRODIURIL) 25 MG tablet [Pharmacy Med Name: HYDROCHLOROTHIAZIDE 25 MG TAB] 90 tablet 3     Sig: TAKE 1 TABLET BY MOUTH EVERY DAY         For Pharmacy Admin Tracking Only    Program: Medication Refill  CPA in place:    Recommendation Provided To:    Intervention Detail: New Rx: 1, reason: Patient Preference  Intervention Accepted By:   Sintia Jeffrey Closed?:    Time Spent (min): 5

## 2023-12-05 ENCOUNTER — OFFICE VISIT (OUTPATIENT)
Age: 48
End: 2023-12-05
Payer: COMMERCIAL

## 2023-12-05 VITALS
WEIGHT: 127.4 LBS | SYSTOLIC BLOOD PRESSURE: 115 MMHG | DIASTOLIC BLOOD PRESSURE: 70 MMHG | RESPIRATION RATE: 16 BRPM | BODY MASS INDEX: 23.45 KG/M2 | TEMPERATURE: 97.6 F | OXYGEN SATURATION: 96 % | HEIGHT: 62 IN | HEART RATE: 76 BPM

## 2023-12-05 DIAGNOSIS — Z79.899 ENCOUNTER FOR LONG-TERM (CURRENT) USE OF MEDICATIONS: ICD-10-CM

## 2023-12-05 DIAGNOSIS — I10 ESSENTIAL (PRIMARY) HYPERTENSION: Primary | ICD-10-CM

## 2023-12-05 DIAGNOSIS — Z12.11 COLON CANCER SCREENING: ICD-10-CM

## 2023-12-05 PROCEDURE — 3074F SYST BP LT 130 MM HG: CPT | Performed by: FAMILY MEDICINE

## 2023-12-05 PROCEDURE — 99212 OFFICE O/P EST SF 10 MIN: CPT | Performed by: FAMILY MEDICINE

## 2023-12-05 PROCEDURE — 3078F DIAST BP <80 MM HG: CPT | Performed by: FAMILY MEDICINE

## 2023-12-05 SDOH — ECONOMIC STABILITY: FOOD INSECURITY: WITHIN THE PAST 12 MONTHS, THE FOOD YOU BOUGHT JUST DIDN'T LAST AND YOU DIDN'T HAVE MONEY TO GET MORE.: NEVER TRUE

## 2023-12-05 SDOH — ECONOMIC STABILITY: FOOD INSECURITY: WITHIN THE PAST 12 MONTHS, YOU WORRIED THAT YOUR FOOD WOULD RUN OUT BEFORE YOU GOT MONEY TO BUY MORE.: NEVER TRUE

## 2023-12-05 SDOH — ECONOMIC STABILITY: INCOME INSECURITY: HOW HARD IS IT FOR YOU TO PAY FOR THE VERY BASICS LIKE FOOD, HOUSING, MEDICAL CARE, AND HEATING?: NOT HARD AT ALL

## 2023-12-05 ASSESSMENT — ENCOUNTER SYMPTOMS
CONSTIPATION: 0
COUGH: 0
RHINORRHEA: 0
CHEST TIGHTNESS: 0
ABDOMINAL PAIN: 0
BLOOD IN STOOL: 0
SHORTNESS OF BREATH: 0

## 2023-12-05 NOTE — PROGRESS NOTES
Subjective:      Patient ID: Saroj Moses is a 53 y.o. female. HPI  Follow up on chronic medical problems. 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 Cardiovascular Review:  The patient has hypertension. Compliant with taking her medications. Diet and Lifestyle: generally follows a low fat low cholesterol diet, generally follows a low sodium diet, exercises regularly with strength training and cardio. Weight is stable       Pertinent ROS: taking medications as instructed, no medication side effects noted, no TIA's, no chest pain on exertion, no dyspnea on exertion, no swelling of ankles, no palpitations. HM:  Mammo: 10/31/23   Colonoscopy: had cologuard done a few years ago but wants to get colonoscopy done now. No past medical history on file. Past Surgical History:   Procedure Laterality Date    MYOMECTOMY  12/21/2016    Dr. Audelia Mandel     Current Outpatient Medications   Medication Sig Dispense Refill    hydroCHLOROthiazide (HYDRODIURIL) 25 MG tablet TAKE 1 TABLET BY MOUTH EVERY DAY 90 tablet 3    loratadine (CLARITIN) 10 MG tablet Take 1 tablet by mouth daily as needed (allergies) 30 tablet 1    amLODIPine (NORVASC) 5 MG tablet Take 1 tablet by mouth daily      Butoconazole Nitrate, 1 Dose, (GYNAZOLE-1) 2 % CREA Place 1 applicator vaginally as needed      latanoprost (XALATAN) 0.005 % ophthalmic solution Apply 1 drop to eye nightly      potassium chloride (KLOR-CON M) 20 MEQ extended release tablet Take 1 tablet by mouth daily      timolol (TIMOPTIC-XE) 0.5 % ophthalmic gel-forming INSTILL 1 DROP INTO BOTH EYES EVERY MORNING       No current facility-administered medications for this visit.       Family History   Problem Relation Age of Onset    Lupus Mother     Heart Disease Mother     Kidney Disease Brother     Diabetes Sister     Hypertension Sister     Hypertension Brother     Hypertension Sister     Hypertension Mother

## 2023-12-06 LAB
25(OH)D3 SERPL-MCNC: 23.4 NG/ML (ref 30–100)
ANION GAP SERPL CALC-SCNC: 4 MMOL/L (ref 5–15)
BUN SERPL-MCNC: 29 MG/DL (ref 6–20)
BUN/CREAT SERPL: 28 (ref 12–20)
CALCIUM SERPL-MCNC: 9.2 MG/DL (ref 8.5–10.1)
CHLORIDE SERPL-SCNC: 102 MMOL/L (ref 97–108)
CO2 SERPL-SCNC: 31 MMOL/L (ref 21–32)
CREAT SERPL-MCNC: 1.05 MG/DL (ref 0.55–1.02)
GLUCOSE SERPL-MCNC: 92 MG/DL (ref 65–100)
POTASSIUM SERPL-SCNC: 3.4 MMOL/L (ref 3.5–5.1)
SODIUM SERPL-SCNC: 137 MMOL/L (ref 136–145)

## 2023-12-07 RX ORDER — POTASSIUM CHLORIDE 20 MEQ/1
20 TABLET, EXTENDED RELEASE ORAL 2 TIMES DAILY
Qty: 180 TABLET | Refills: 3 | Status: SHIPPED | OUTPATIENT
Start: 2023-12-07

## 2024-02-15 ENCOUNTER — PATIENT MESSAGE (OUTPATIENT)
Age: 49
End: 2024-02-15

## 2024-02-15 NOTE — TELEPHONE ENCOUNTER
From: Regino Marquez  To: Dr. Rachel Koroma  Sent: 2/15/2024 12:24 PM EST  Subject: Physical Statement form    Good afternoon Dr. Koroma,  I just wanted to touch base with you regarding my attachment to the last message. I do not have a latex allergy. Not sure if that information is in my records or not.     Regino Marquez

## 2024-02-19 ENCOUNTER — CLINICAL DOCUMENTATION (OUTPATIENT)
Age: 49
End: 2024-02-19

## 2024-04-17 DIAGNOSIS — I10 ESSENTIAL (PRIMARY) HYPERTENSION: ICD-10-CM

## 2024-04-17 RX ORDER — AMLODIPINE BESYLATE 5 MG/1
5 TABLET ORAL DAILY
Qty: 90 TABLET | Refills: 3 | Status: SHIPPED | OUTPATIENT
Start: 2024-04-17

## 2024-04-17 NOTE — TELEPHONE ENCOUNTER
Last appointment: 12/5/23  Next appointment: 6/7/24  Previous refill encounter(s): 4/14/23    Requested Prescriptions     Pending Prescriptions Disp Refills    amLODIPine (NORVASC) 5 MG tablet [Pharmacy Med Name: AMLODIPINE BESYLATE 5 MG TAB] 90 tablet 3     Sig: TAKE 1 TABLET BY MOUTH EVERY DAY         For Pharmacy Admin Tracking Only    Program: Medication Refill  CPA in place:    Recommendation Provided To:   Intervention Detail: New Rx: 1, reason: Patient Preference  Intervention Accepted By:   Gap Closed?:    Time Spent (min): 5

## 2024-06-07 ENCOUNTER — OFFICE VISIT (OUTPATIENT)
Age: 49
End: 2024-06-07
Payer: COMMERCIAL

## 2024-06-07 VITALS
HEIGHT: 62 IN | SYSTOLIC BLOOD PRESSURE: 118 MMHG | DIASTOLIC BLOOD PRESSURE: 74 MMHG | BODY MASS INDEX: 24.25 KG/M2 | TEMPERATURE: 98.5 F | HEART RATE: 71 BPM | RESPIRATION RATE: 16 BRPM | OXYGEN SATURATION: 97 % | WEIGHT: 131.8 LBS

## 2024-06-07 DIAGNOSIS — Z79.899 ENCOUNTER FOR LONG-TERM (CURRENT) USE OF MEDICATIONS: ICD-10-CM

## 2024-06-07 DIAGNOSIS — I10 ESSENTIAL (PRIMARY) HYPERTENSION: Primary | ICD-10-CM

## 2024-06-07 LAB
ALBUMIN SERPL-MCNC: 3.7 G/DL (ref 3.5–5)
ALBUMIN/GLOB SERPL: 1.1 (ref 1.1–2.2)
ALP SERPL-CCNC: 66 U/L (ref 45–117)
ALT SERPL-CCNC: 34 U/L (ref 12–78)
ANION GAP SERPL CALC-SCNC: 5 MMOL/L (ref 5–15)
AST SERPL-CCNC: 30 U/L (ref 15–37)
BILIRUB SERPL-MCNC: 0.2 MG/DL (ref 0.2–1)
BUN SERPL-MCNC: 25 MG/DL (ref 6–20)
BUN/CREAT SERPL: 24 (ref 12–20)
CALCIUM SERPL-MCNC: 9.4 MG/DL (ref 8.5–10.1)
CHLORIDE SERPL-SCNC: 103 MMOL/L (ref 97–108)
CHOLEST SERPL-MCNC: 145 MG/DL
CO2 SERPL-SCNC: 31 MMOL/L (ref 21–32)
CREAT SERPL-MCNC: 1.04 MG/DL (ref 0.55–1.02)
ERYTHROCYTE [DISTWIDTH] IN BLOOD BY AUTOMATED COUNT: 12.1 % (ref 11.5–14.5)
GLOBULIN SER CALC-MCNC: 3.5 G/DL (ref 2–4)
GLUCOSE SERPL-MCNC: 90 MG/DL (ref 65–100)
HCT VFR BLD AUTO: 40 % (ref 35–47)
HDLC SERPL-MCNC: 72 MG/DL
HDLC SERPL: 2 (ref 0–5)
HGB BLD-MCNC: 13.7 G/DL (ref 11.5–16)
LDLC SERPL CALC-MCNC: 66.6 MG/DL (ref 0–100)
MCH RBC QN AUTO: 32.6 PG (ref 26–34)
MCHC RBC AUTO-ENTMCNC: 34.3 G/DL (ref 30–36.5)
MCV RBC AUTO: 95.2 FL (ref 80–99)
NRBC # BLD: 0 K/UL (ref 0–0.01)
NRBC BLD-RTO: 0 PER 100 WBC
PLATELET # BLD AUTO: 148 K/UL (ref 150–400)
PMV BLD AUTO: 12.9 FL (ref 8.9–12.9)
POTASSIUM SERPL-SCNC: 3.7 MMOL/L (ref 3.5–5.1)
PROT SERPL-MCNC: 7.2 G/DL (ref 6.4–8.2)
RBC # BLD AUTO: 4.2 M/UL (ref 3.8–5.2)
SODIUM SERPL-SCNC: 139 MMOL/L (ref 136–145)
TRIGL SERPL-MCNC: 32 MG/DL
VLDLC SERPL CALC-MCNC: 6.4 MG/DL
WBC # BLD AUTO: 4.9 K/UL (ref 3.6–11)

## 2024-06-07 PROCEDURE — 3074F SYST BP LT 130 MM HG: CPT | Performed by: FAMILY MEDICINE

## 2024-06-07 PROCEDURE — 3078F DIAST BP <80 MM HG: CPT | Performed by: FAMILY MEDICINE

## 2024-06-07 PROCEDURE — 99212 OFFICE O/P EST SF 10 MIN: CPT | Performed by: FAMILY MEDICINE

## 2024-06-07 RX ORDER — HYDROCHLOROTHIAZIDE 25 MG/1
12.5 TABLET ORAL DAILY
Qty: 90 TABLET | Refills: 3
Start: 2024-06-07

## 2024-06-07 ASSESSMENT — PATIENT HEALTH QUESTIONNAIRE - PHQ9
1. LITTLE INTEREST OR PLEASURE IN DOING THINGS: NOT AT ALL
2. FEELING DOWN, DEPRESSED OR HOPELESS: NOT AT ALL
SUM OF ALL RESPONSES TO PHQ QUESTIONS 1-9: 0
SUM OF ALL RESPONSES TO PHQ9 QUESTIONS 1 & 2: 0
SUM OF ALL RESPONSES TO PHQ QUESTIONS 1-9: 0

## 2024-06-07 ASSESSMENT — ENCOUNTER SYMPTOMS
SHORTNESS OF BREATH: 0
CONSTIPATION: 0
COUGH: 0
BLOOD IN STOOL: 0
RHINORRHEA: 0
ABDOMINAL PAIN: 0
CHEST TIGHTNESS: 0

## 2024-06-07 NOTE — PROGRESS NOTES
Chief Complaint   Patient presents with    6 Month Follow-Up       \"Have you been to the ER, urgent care clinic since your last visit?  Hospitalized since your last visit?\"    NO    “Have you seen or consulted any other health care providers outside of [facility] since your last visit?”    NO    “Have you had a colorectal cancer screening such as a colonoscopy/FIT/Cologuard?    NO    No colonoscopy on file  Date of last Cologuard: 2021  No FIT/FOBT on file   No flexible sigmoidoscopy on file              Vitals:    24 1401   BP: 118/74   Pulse: 71   Resp: 16   Temp: 98.5 °F (36.9 °C)   SpO2: 97%       Health Maintenance Due   Topic Date Due    Colorectal Cancer Screen  2024        The patient, Regino Marquez, identity was verified by name and .   
REFERENCE RANGE  (NOTE)  HbA1C Interpretive Ranges  <5.7              Normal  5.7 - 6.4         Consider Prediabetes  >6.5              Consider Diabetes       Hemoglobin A1C, POC   Date Value Ref Range Status   01/08/2020 5.5 % Final     Lab Results   Component Value Date    URT7UQY 2.08 06/05/2023       Review of Systems   Constitutional:  Negative for activity change.   HENT:  Negative for congestion and rhinorrhea.    Respiratory:  Negative for cough, chest tightness and shortness of breath.    Cardiovascular:  Negative for chest pain, palpitations and leg swelling.   Gastrointestinal:  Negative for abdominal pain, blood in stool and constipation.   Endocrine: Negative for polyuria.   Genitourinary:  Negative for difficulty urinating, frequency, hematuria and urgency.   Musculoskeletal:  Negative for arthralgias, joint swelling and myalgias.   Skin:  Negative for rash.   Allergic/Immunologic: Negative for environmental allergies.   Neurological:  Negative for dizziness, light-headedness and headaches.   Psychiatric/Behavioral:  Negative for dysphoric mood and sleep disturbance. The patient is not nervous/anxious.        Objective:   Physical Exam  Constitutional:       Appearance: Normal appearance.      Comments: /74 (Site: Left Upper Arm, Position: Sitting, Cuff Size: Large Adult)   Pulse 71   Temp 98.5 °F (36.9 °C) (Oral)   Resp 16   Ht 1.575 m (5' 2\")   Wt 59.8 kg (131 lb 12.8 oz)   LMP 04/22/2024   SpO2 97%   BMI 24.11 kg/m²    HENT:      Right Ear: Tympanic membrane normal.      Left Ear: Tympanic membrane normal.      Nose: No rhinorrhea.      Mouth/Throat:      Mouth: Mucous membranes are moist.   Cardiovascular:      Rate and Rhythm: Normal rate and regular rhythm.   Pulmonary:      Effort: Pulmonary effort is normal.      Breath sounds: Normal breath sounds.   Musculoskeletal:      Cervical back: Normal range of motion.      Right lower leg: No edema.      Left lower leg: No edema.

## 2024-08-22 DIAGNOSIS — I10 ESSENTIAL (PRIMARY) HYPERTENSION: ICD-10-CM

## 2024-08-22 RX ORDER — HYDROCHLOROTHIAZIDE 25 MG/1
12.5 TABLET ORAL DAILY
Qty: 45 TABLET | Refills: 3 | Status: SHIPPED | OUTPATIENT
Start: 2024-08-22

## 2024-08-22 NOTE — TELEPHONE ENCOUNTER
Rx pended with updated dose of 0.5 tab/day as per 6/7/24.     Last appointment: 6/7/24  Next appointment: 12/10/24  Previous refill encounter(s): 8/21/23    Requested Prescriptions     Pending Prescriptions Disp Refills    hydroCHLOROthiazide (HYDRODIURIL) 25 MG tablet [Pharmacy Med Name: HYDROCHLOROTHIAZIDE 25 MG TAB] 45 tablet 3     Sig: Take 0.5 tablets by mouth daily         For Pharmacy Admin Tracking Only    Program: Medication Refill  CPA in place:    Recommendation Provided To:   Intervention Detail: New Rx: 1, reason: Patient Preference  Intervention Accepted By:   Gap Closed?:    Time Spent (min): 5

## 2024-09-10 ENCOUNTER — TELEPHONE (OUTPATIENT)
Age: 49
End: 2024-09-10

## 2024-12-10 ENCOUNTER — OFFICE VISIT (OUTPATIENT)
Age: 49
End: 2024-12-10
Payer: COMMERCIAL

## 2024-12-10 VITALS
HEART RATE: 77 BPM | RESPIRATION RATE: 24 BRPM | BODY MASS INDEX: 24.01 KG/M2 | SYSTOLIC BLOOD PRESSURE: 125 MMHG | HEIGHT: 63 IN | DIASTOLIC BLOOD PRESSURE: 75 MMHG | TEMPERATURE: 98.1 F | WEIGHT: 135.5 LBS | OXYGEN SATURATION: 99 %

## 2024-12-10 DIAGNOSIS — E87.6 HYPOKALEMIA: ICD-10-CM

## 2024-12-10 DIAGNOSIS — Z12.11 COLON CANCER SCREENING: ICD-10-CM

## 2024-12-10 DIAGNOSIS — Z00.00 ROUTINE GENERAL MEDICAL EXAMINATION AT A HEALTH CARE FACILITY: Primary | ICD-10-CM

## 2024-12-10 DIAGNOSIS — Z79.899 ENCOUNTER FOR LONG-TERM (CURRENT) USE OF MEDICATIONS: ICD-10-CM

## 2024-12-10 DIAGNOSIS — I10 ESSENTIAL (PRIMARY) HYPERTENSION: ICD-10-CM

## 2024-12-10 LAB
APPEARANCE UR: CLEAR
BACTERIA URNS QL MICRO: NEGATIVE /HPF
BILIRUB UR QL: NEGATIVE
COLOR UR: NORMAL
EPITH CASTS URNS QL MICRO: NORMAL /LPF
GLUCOSE UR STRIP.AUTO-MCNC: NEGATIVE MG/DL
HGB UR QL STRIP: NEGATIVE
HYALINE CASTS URNS QL MICRO: NORMAL /LPF (ref 0–5)
KETONES UR QL STRIP.AUTO: NEGATIVE MG/DL
LEUKOCYTE ESTERASE UR QL STRIP.AUTO: NEGATIVE
NITRITE UR QL STRIP.AUTO: NEGATIVE
PH UR STRIP: 6.5 (ref 5–8)
PROT UR STRIP-MCNC: NEGATIVE MG/DL
RBC #/AREA URNS HPF: NORMAL /HPF (ref 0–5)
SP GR UR REFRACTOMETRY: 1.01 (ref 1–1.03)
SPECIMEN HOLD: NORMAL
UROBILINOGEN UR QL STRIP.AUTO: 0.2 EU/DL (ref 0.2–1)
WBC URNS QL MICRO: NORMAL /HPF (ref 0–4)

## 2024-12-10 PROCEDURE — 3074F SYST BP LT 130 MM HG: CPT | Performed by: FAMILY MEDICINE

## 2024-12-10 PROCEDURE — 99396 PREV VISIT EST AGE 40-64: CPT | Performed by: FAMILY MEDICINE

## 2024-12-10 PROCEDURE — 90715 TDAP VACCINE 7 YRS/> IM: CPT | Performed by: FAMILY MEDICINE

## 2024-12-10 PROCEDURE — 3078F DIAST BP <80 MM HG: CPT | Performed by: FAMILY MEDICINE

## 2024-12-10 PROCEDURE — 90471 IMMUNIZATION ADMIN: CPT | Performed by: FAMILY MEDICINE

## 2024-12-10 RX ORDER — POTASSIUM CHLORIDE 1500 MG/1
20 TABLET, EXTENDED RELEASE ORAL 2 TIMES DAILY
Qty: 180 TABLET | Refills: 3 | Status: SHIPPED | OUTPATIENT
Start: 2024-12-10

## 2024-12-10 SDOH — ECONOMIC STABILITY: FOOD INSECURITY: WITHIN THE PAST 12 MONTHS, THE FOOD YOU BOUGHT JUST DIDN'T LAST AND YOU DIDN'T HAVE MONEY TO GET MORE.: NEVER TRUE

## 2024-12-10 SDOH — ECONOMIC STABILITY: FOOD INSECURITY: WITHIN THE PAST 12 MONTHS, YOU WORRIED THAT YOUR FOOD WOULD RUN OUT BEFORE YOU GOT MONEY TO BUY MORE.: NEVER TRUE

## 2024-12-10 SDOH — ECONOMIC STABILITY: INCOME INSECURITY: HOW HARD IS IT FOR YOU TO PAY FOR THE VERY BASICS LIKE FOOD, HOUSING, MEDICAL CARE, AND HEATING?: NOT HARD AT ALL

## 2024-12-10 ASSESSMENT — ANXIETY QUESTIONNAIRES
5. BEING SO RESTLESS THAT IT IS HARD TO SIT STILL: NOT AT ALL
GAD7 TOTAL SCORE: 0
2. NOT BEING ABLE TO STOP OR CONTROL WORRYING: NOT AT ALL
4. TROUBLE RELAXING: NOT AT ALL
GAD7 TOTAL SCORE: 0
6. BECOMING EASILY ANNOYED OR IRRITABLE: NOT AT ALL
1. FEELING NERVOUS, ANXIOUS, OR ON EDGE: NOT AT ALL
7. FEELING AFRAID AS IF SOMETHING AWFUL MIGHT HAPPEN: NOT AT ALL
3. WORRYING TOO MUCH ABOUT DIFFERENT THINGS: NOT AT ALL
IF YOU CHECKED OFF ANY PROBLEMS ON THIS QUESTIONNAIRE, HOW DIFFICULT HAVE THESE PROBLEMS MADE IT FOR YOU TO DO YOUR WORK, TAKE CARE OF THINGS AT HOME, OR GET ALONG WITH OTHER PEOPLE: NOT DIFFICULT AT ALL

## 2024-12-10 ASSESSMENT — PATIENT HEALTH QUESTIONNAIRE - PHQ9
1. LITTLE INTEREST OR PLEASURE IN DOING THINGS: NOT AT ALL
SUM OF ALL RESPONSES TO PHQ9 QUESTIONS 1 & 2: 0
SUM OF ALL RESPONSES TO PHQ QUESTIONS 1-9: 0
2. FEELING DOWN, DEPRESSED OR HOPELESS: NOT AT ALL

## 2024-12-10 NOTE — PROGRESS NOTES
Chief Complaint   Patient presents with    Annual Exam       Here for her an annual physical exam.    She sees OBGYN and they do PAP and breast exam.      \"Have you been to the ER, urgent care clinic since your last visit?  Hospitalized since your last visit?\"    NO    “Have you seen or consulted any other health care providers outside of [facility] since your last visit?”    NO        “Have you had a colorectal cancer screening such as a colonoscopy/FIT/Cologuard?    NO    Has been trying to get a colonoscopy for a the past several months and no one has openings, per patient.  Would like to discuss getting the Cologuard with Dr. Webster.    No colonoscopy on file  Date of last Cologuard: 11/16/2021  No FIT/FOBT on file   No flexible sigmoidoscopy on file         Click Here for Release of Records Request    
Subjective:    48 yo female for Well Woman Check.    Patient complains of sore throat.  Associated symptoms include slight nasal congestion.  Onset of symptoms was 2-3 days ago. She is drinking plenty of fluids.  She has not had recent close exposure to someone with proven streptococcal pharyngitis.  Has not taken anything for the symptoms.      Cardiovascular Review:  The patient has hypertension.  Compliant with taking her medications.  Diet and Lifestyle: generally follows a low fat low cholesterol diet, generally follows a low sodium diet, exercises sporadically.   Home BP Monitorins/70-80s.  Weight is down from 164 to 129 from 1 year ago.      Pertinent ROS: taking medications as instructed, no medication side effects noted, no TIA's, no chest pain on exertion, no dyspnea on exertion, no swelling of ankles, no palpitations.       Patient Active Problem List   Diagnosis    Essential hypertension, benign    Glaucoma    Encounter for medication monitoring     Patient Active Problem List    Diagnosis Date Noted    Encounter for medication monitoring 2017    Essential hypertension, benign 2014    Glaucoma 2014     Current Outpatient Medications   Medication Sig Dispense Refill    hydroCHLOROthiazide (HYDRODIURIL) 25 MG tablet Take 0.5 tablets by mouth daily 45 tablet 3    amLODIPine (NORVASC) 5 MG tablet TAKE 1 TABLET BY MOUTH EVERY DAY 90 tablet 3    potassium chloride (KLOR-CON M) 20 MEQ extended release tablet Take 1 tablet by mouth 2 times daily 180 tablet 3    loratadine (CLARITIN) 10 MG tablet Take 1 tablet by mouth daily as needed (allergies) 30 tablet 1    Butoconazole Nitrate, 1 Dose, (GYNAZOLE-1) 2 % CREA Place 1 applicator vaginally as needed (Patient not taking: Reported on 2023)      latanoprost (XALATAN) 0.005 % ophthalmic solution Apply 1 drop to eye nightly      timolol (TIMOPTIC-XE) 0.5 % ophthalmic gel-forming INSTILL 1 DROP INTO BOTH EYES EVERY MORNING       No current 
weight and regular exercise program such as daily walking 30 min perday 4-5 times per week, as physiologic means to achieve blood pressure control.  Medication compliance advised.     Hypokalemia  -     refill potassium chloride (KLOR-CON M) 20 MEQ extended release tablet; Take 1 tablet by mouth 2 times daily    Encounter for long-term (current) use of medications  -     Urinalysis with Microscopic; Future    Colon cancer screening  -     Cologuard (Fecal DNA Colorectal Cancer Screening)    Other orders  -     Tdap, BOOSTRIX, (age 10 yrs+), IM      Return in about 6 months (around 6/10/2025).  reviewed diet, exercise and weight control  cardiovascular risk and specific lipid/LDL goals reviewed  reviewed medications and side effects in detail      I have discussed diagnosis listed in this note with pt and/or family. I have discussed treatment plans and options and the risk/benefit analysis of those options, including safe use of medications and possible medication side effects.   Through the use of shared decision making we have agreed to the above plan. The patient has received an after-visit summary and questions were answered concerning future plans and follow up.  Advise pt of any urgent changes then to proceed to the ER.

## 2024-12-21 LAB — NONINV COLON CA DNA+OCC BLD SCRN STL QL: POSITIVE

## 2025-01-02 ENCOUNTER — TELEPHONE (OUTPATIENT)
Age: 50
End: 2025-01-02

## 2025-01-02 DIAGNOSIS — R19.5 POSITIVE COLORECTAL CANCER SCREENING USING COLOGUARD TEST: Primary | ICD-10-CM

## 2025-02-05 DIAGNOSIS — I10 ESSENTIAL (PRIMARY) HYPERTENSION: ICD-10-CM

## 2025-02-06 RX ORDER — AMLODIPINE BESYLATE 5 MG/1
5 TABLET ORAL DAILY
Qty: 90 TABLET | Refills: 3 | Status: SHIPPED | OUTPATIENT
Start: 2025-02-06

## 2025-03-19 ENCOUNTER — TELEPHONE (OUTPATIENT)
Age: 50
End: 2025-03-19

## 2025-06-11 DIAGNOSIS — I10 ESSENTIAL (PRIMARY) HYPERTENSION: ICD-10-CM

## 2025-06-11 RX ORDER — HYDROCHLOROTHIAZIDE 25 MG/1
12.5 TABLET ORAL DAILY
Qty: 45 TABLET | Refills: 3 | Status: SHIPPED | OUTPATIENT
Start: 2025-06-11

## 2025-06-11 NOTE — TELEPHONE ENCOUNTER
Last appointment: 12/10/24  Next appointment: Advised to follow-up 6/10/25  Previous refill encounter(s): 8/22/24 #45 with 3 refills    Requested Prescriptions     Pending Prescriptions Disp Refills    hydroCHLOROthiazide (HYDRODIURIL) 25 MG tablet [Pharmacy Med Name: HYDROCHLOROTHIAZIDE 25 MG TAB] 45 tablet 3     Sig: TAKE 1/2 TABLET BY MOUTH DAILY         For Pharmacy Admin Tracking Only    Program: Medication Refill  CPA in place:    Recommendation Provided To:   Intervention Detail: New Rx: 1, reason: Patient Preference  Intervention Accepted By:   Gap Closed?:    Time Spent (min): 5